# Patient Record
Sex: FEMALE | Race: WHITE | HISPANIC OR LATINO | Employment: FULL TIME | URBAN - METROPOLITAN AREA
[De-identification: names, ages, dates, MRNs, and addresses within clinical notes are randomized per-mention and may not be internally consistent; named-entity substitution may affect disease eponyms.]

---

## 2021-01-19 ENCOUNTER — GYNECOLOGY VISIT (OUTPATIENT)
Dept: OBGYN | Facility: CLINIC | Age: 26
End: 2021-01-19
Payer: COMMERCIAL

## 2021-01-19 VITALS
HEIGHT: 66 IN | BODY MASS INDEX: 25.81 KG/M2 | SYSTOLIC BLOOD PRESSURE: 114 MMHG | WEIGHT: 160.6 LBS | DIASTOLIC BLOOD PRESSURE: 62 MMHG

## 2021-01-19 DIAGNOSIS — N92.6 MISSED MENSES: ICD-10-CM

## 2021-01-19 DIAGNOSIS — Z32.01 POSITIVE PREGNANCY TEST: ICD-10-CM

## 2021-01-19 DIAGNOSIS — N93.8 DUB (DYSFUNCTIONAL UTERINE BLEEDING): ICD-10-CM

## 2021-01-19 LAB
INT CON NEG: NEGATIVE
INT CON POS: POSITIVE
POC URINE PREGNANCY TEST: POSITIVE

## 2021-01-19 PROCEDURE — 81025 URINE PREGNANCY TEST: CPT | Performed by: OBSTETRICS & GYNECOLOGY

## 2021-01-19 PROCEDURE — 99202 OFFICE O/P NEW SF 15 MIN: CPT | Performed by: OBSTETRICS & GYNECOLOGY

## 2021-01-19 SDOH — HEALTH STABILITY: MENTAL HEALTH: HOW OFTEN DO YOU HAVE A DRINK CONTAINING ALCOHOL?: NEVER

## 2021-01-19 NOTE — NON-PROVIDER
Patient here for GYN/DUB.  UPT= Positive  LMP= 10/28/2020  ARASELI= 08/04/2021  GA= 11w6d  Last pap: has not had one   Phone number: [phone]  Pharmacy verified  C/o Pt states having some nausea and some cramping.

## 2021-01-19 NOTE — PROGRESS NOTES
"GYN Visit    CC: missed menses    HPI: 25 y.o.  c/o missed period.LMP 10/28/2020, sure LMP, would be 11w6d  No VB, minimal nausea. No concerns today.          ROS:  gen: feels well, denies concerns  abd: denies pain, +nausea, negative vomiting  : denies vaginal bleeding, discharge, pain    OB History    Para Term  AB Living   2 1 1     1   SAB TAB Ectopic Molar Multiple Live Births             1      # Outcome Date GA Lbr Amrit/2nd Weight Sex Delivery Anes PTL Lv   2 Current            1 Term 16 40w0d  2.9 kg (6 lb 6.3 oz) F Vag-Spont   BISI      Birth Comments: Pt states no complications        GYNHx:  Menses q28d  Denies STIs  Never had a pap      PMHx: denies  PSHx: denies    Current Outpatient Medications on File Prior to Visit   Medication Sig Dispense Refill   • Prenatal MV-Min-Fe Fum-FA-DHA (PRENATAL 1 PO) Take  by mouth.       No current facility-administered medications on file prior to visit.      Allergies: Patient has no known allergies.    Family History   Problem Relation Age of Onset   • No Known Problems Mother    • No Known Problems Father    • No Known Problems Sister    • No Known Problems Brother    • Cancer Maternal Grandmother    • Cancer Maternal Grandfather    • No Known Problems Paternal Grandmother    • Alzheimer's Disease Paternal Grandfather    • No Known Problems Brother    • No Known Problems Sister    • No Known Problems Sister      Social History     Tobacco Use   • Smoking status: Never Smoker   • Smokeless tobacco: Never Used   Substance Use Topics   • Alcohol use: Never     Frequency: Never   • Drug use: Never       PE:   /62   Ht 1.676 m (5' 6\")   Wt 72.8 kg (160 lb 9.6 oz)   BMI 25.92 kg/m²   gen; AAO, NAD, affect appropriate  Ext: NT, edema  : normal external female genitalia  Skin: dry, intact, no lesions    Transvaginal US performed and per my read:    Indication: missed menses, +UPT    Findings:   cuadra intrauterine pregnancy with + " gestational sac  CRL 3.93 cm (10w6d)  Positive fetal cardiac activity, 160s BPM  Right ovary visualized and normal. Left Ovary visualized and normal. Cervical length grossly normal   No free fluid in the cul-de-sac.    Impression: viable single intrauterine pregnancy @ 10w6d. EDC by US of 2021          A/P: 25 y.o.  w/ missed menses, +UPT  Viable IUP confirmed today  Dating: ARASELI 21 by lmp c/w 11wk US today; US exactly 7 days off from LMP of which pt is sure so dating not changed (if >7d would change)      RTC for NOB visit    Domonique Shirley MD  [facility], Women's Health

## 2021-02-19 ENCOUNTER — OFFICE VISIT (OUTPATIENT)
Dept: OBGYN | Facility: CLINIC | Age: 26
End: 2021-02-19

## 2021-03-25 ENCOUNTER — INITIAL PRENATAL (OUTPATIENT)
Dept: OBGYN | Facility: CLINIC | Age: 26
End: 2021-03-25
Payer: COMMERCIAL

## 2021-03-25 ENCOUNTER — HOSPITAL ENCOUNTER (OUTPATIENT)
Facility: MEDICAL CENTER | Age: 26
End: 2021-03-25
Attending: NURSE PRACTITIONER
Payer: COMMERCIAL

## 2021-03-25 VITALS
BODY MASS INDEX: 25.58 KG/M2 | DIASTOLIC BLOOD PRESSURE: 73 MMHG | SYSTOLIC BLOOD PRESSURE: 121 MMHG | WEIGHT: 163 LBS | HEIGHT: 67 IN

## 2021-03-25 DIAGNOSIS — Z34.82 ENCOUNTER FOR SUPERVISION OF OTHER NORMAL PREGNANCY, SECOND TRIMESTER: Primary | ICD-10-CM

## 2021-03-25 DIAGNOSIS — Z34.82 ENCOUNTER FOR SUPERVISION OF OTHER NORMAL PREGNANCY, SECOND TRIMESTER: ICD-10-CM

## 2021-03-25 LAB
APPEARANCE UR: NORMAL
BILIRUB UR STRIP-MCNC: NORMAL MG/DL
COLOR UR AUTO: YELLOW
GLUCOSE UR STRIP.AUTO-MCNC: NEGATIVE MG/DL
KETONES UR STRIP.AUTO-MCNC: 15 MG/DL
LEUKOCYTE ESTERASE UR QL STRIP.AUTO: NORMAL
NITRITE UR QL STRIP.AUTO: NEGATIVE
PH UR STRIP.AUTO: 6.5 [PH] (ref 5–8)
PROT UR QL STRIP: NEGATIVE MG/DL
RBC UR QL AUTO: NEGATIVE
SP GR UR STRIP.AUTO: 1.02
UROBILINOGEN UR STRIP-MCNC: NORMAL MG/DL

## 2021-03-25 PROCEDURE — 59401 PR NEW OB VISIT: CPT | Performed by: NURSE PRACTITIONER

## 2021-03-25 PROCEDURE — 87491 CHLMYD TRACH DNA AMP PROBE: CPT

## 2021-03-25 PROCEDURE — 88175 CYTOPATH C/V AUTO FLUID REDO: CPT

## 2021-03-25 PROCEDURE — 81002 URINALYSIS NONAUTO W/O SCOPE: CPT | Performed by: NURSE PRACTITIONER

## 2021-03-25 PROCEDURE — 87591 N.GONORRHOEAE DNA AMP PROB: CPT

## 2021-03-25 ASSESSMENT — EDINBURGH POSTNATAL DEPRESSION SCALE (EPDS)
I HAVE BEEN ABLE TO LAUGH AND SEE THE FUNNY SIDE OF THINGS: AS MUCH AS I ALWAYS COULD
I HAVE FELT SCARED OR PANICKY FOR NO GOOD REASON: NO, NOT MUCH
I HAVE BEEN SO UNHAPPY THAT I HAVE HAD DIFFICULTY SLEEPING: NOT AT ALL
I HAVE FELT SAD OR MISERABLE: NOT VERY OFTEN
THINGS HAVE BEEN GETTING ON TOP OF ME: NO, I HAVE BEEN COPING AS WELL AS EVER
I HAVE BEEN ANXIOUS OR WORRIED FOR NO GOOD REASON: YES, SOMETIMES
TOTAL SCORE: 6
I HAVE LOOKED FORWARD WITH ENJOYMENT TO THINGS: AS MUCH AS I EVER DID
I HAVE BEEN SO UNHAPPY THAT I HAVE BEEN CRYING: ONLY OCCASIONALLY
I HAVE BLAMED MYSELF UNNECESSARILY WHEN THINGS WENT WRONG: NOT VERY OFTEN
THE THOUGHT OF HARMING MYSELF HAS OCCURRED TO ME: NEVER

## 2021-03-25 ASSESSMENT — ENCOUNTER SYMPTOMS
CARDIOVASCULAR NEGATIVE: 1
EYES NEGATIVE: 1
NEUROLOGICAL NEGATIVE: 1
GASTROINTESTINAL NEGATIVE: 1
CONSTITUTIONAL NEGATIVE: 1
RESPIRATORY NEGATIVE: 1
MUSCULOSKELETAL NEGATIVE: 1
PSYCHIATRIC NEGATIVE: 1

## 2021-03-25 NOTE — PROGRESS NOTES
S:  Thao Pate is a 26 y.o.  who presents for her new OB exam.  She is 21w1d with and ARASELI of Estimated Date of Delivery: 21 based off of LMP . She has no complaints.  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 Denies ER visits or previous care in this pregnancy but did have  here.     Desires AFP.  Declines CF.  Denies VB, LOF, or cramping.  Denies dysuria, vaginal DC. Reports normal fetal movement.     Pt is single and lives with FOB.  Pregnancy is planned. Same FOB as first child.      Discussed diet and exercise during pregnancy. Encouraged good nutrition, and daily exercise including walking or swimming. Discussed expected weight gain during pregnancy. Discussed adequate hydration during pregnancy.  Review of Systems   Constitutional: Negative.    HENT: Negative.    Eyes: Negative.    Respiratory: Negative.    Cardiovascular: Negative.    Gastrointestinal: Negative.    Genitourinary: Negative.    Musculoskeletal: Negative.    Skin: Negative.    Neurological: Negative.    Endo/Heme/Allergies: Negative.    Psychiatric/Behavioral: Negative.    All other systems reviewed and are negative.      History reviewed. No pertinent past medical history.  Family History   Problem Relation Age of Onset   • No Known Problems Mother    • No Known Problems Father    • No Known Problems Sister    • No Known Problems Brother    • Cancer Maternal Grandmother    • Cancer Maternal Grandfather    • No Known Problems Paternal Grandmother    • Alzheimer's Disease Paternal Grandfather    • No Known Problems Brother    • No Known Problems Sister    • No Known Problems Sister      Social History     Socioeconomic History   • Marital status: Single     Spouse name: Not on file   • Number of children: Not on file   • Years of education: Not on file   • Highest education level: Not on file   Occupational History   • Not on file   Tobacco Use   • Smoking status: Never Smoker   •  "Smokeless tobacco: Never Used   Substance and Sexual Activity   • Alcohol use: Never   • Drug use: Never   • Sexual activity: Yes     Partners: Male   Other Topics Concern   • Not on file   Social History Narrative   • Not on file     Social Determinants of Health     Financial Resource Strain:    • Difficulty of Paying Living Expenses:    Food Insecurity:    • Worried About Running Out of Food in the Last Year:    • Ran Out of Food in the Last Year:    Transportation Needs:    • Lack of Transportation (Medical):    • Lack of Transportation (Non-Medical):    Physical Activity:    • Days of Exercise per Week:    • Minutes of Exercise per Session:    Stress:    • Feeling of Stress :    Social Connections:    • Frequency of Communication with Friends and Family:    • Frequency of Social Gatherings with Friends and Family:    • Attends [employer] Services:    • Active Member of Clubs or Organizations:    • Attends Club or Organization Meetings:    • Marital Status:    Intimate Partner Violence:    • Fear of Current or Ex-Partner:    • Emotionally Abused:    • Physically Abused:    • Sexually Abused:      OB History    Para Term  AB Living   2 1 1     1   SAB TAB Ectopic Molar Multiple Live Births             1      # Outcome Date GA Lbr Amrit/2nd Weight Sex Delivery Anes PTL Lv   2 Current            1 Term 16 40w0d  2.9 kg (6 lb 6.3 oz) F Vag-Spont   BISI      Birth Comments: Pt states no complications        History of Varicella Virus: unsure  History of HSV I or II in self or partner: denies  History of Thyroid problems: denies    O:  /73   Ht 1.69 m (5' 6.54\")   Wt 73.9 kg (163 lb)    See Prenatal Physical.    Wet mount: deferred      A:   1.  IUP @ 21w1d per LMP and confirmed by US        2.  S=D        3.  See problem list below        4.  Supervision of normal other pregnancy     There are no problems to display for this patient.        P:  1.  GC/CT & pap done        2.  Prenatal labs " ordered - lab slip given        3.  Discussed PNV, diet, avoidances and adequate water intake        4.  NOB packet given        5.  Return to office in 4 wks        6.  Complete OB US in ASAP wks            Orders Placed This Encounter   • US-OB 2ND 3RD TRI COMPLETE   • THINPREP RFLX HPV ASCUS W/CTNG   • PREG CNTR PRENATAL PN   • HEP C VIRUS ANTIBODY   • URINE DRUG SCREEN W/CONF (AR)   • AFP TETRA   • FOLIC ACID PO

## 2021-03-25 NOTE — PROGRESS NOTES
NOB visit  LMP: 10/28/2021  Reports +FM  WT: 163 lb  BP: 121/73  Flu vaccine offered today, pt states she already received the flu vaccine at her job.  Pt states no other complaints or concerns today  Declines   Good # [phone]

## 2021-03-26 LAB
C TRACH DNA GENITAL QL NAA+PROBE: NEGATIVE
CYTOLOGY REG CYTOL: NORMAL
N GONORRHOEA DNA GENITAL QL NAA+PROBE: NEGATIVE
SPECIMEN SOURCE: NORMAL

## 2021-04-06 ENCOUNTER — HOSPITAL ENCOUNTER (OUTPATIENT)
Dept: RADIOLOGY | Facility: MEDICAL CENTER | Age: 26
End: 2021-04-06
Attending: NURSE PRACTITIONER
Payer: COMMERCIAL

## 2021-04-06 DIAGNOSIS — Z34.82 ENCOUNTER FOR SUPERVISION OF OTHER NORMAL PREGNANCY, SECOND TRIMESTER: ICD-10-CM

## 2021-04-06 DIAGNOSIS — O36.5920 POOR FETAL GROWTH AFFECTING MANAGEMENT OF MOTHER IN SECOND TRIMESTER, SINGLE OR UNSPECIFIED FETUS: ICD-10-CM

## 2021-04-06 PROCEDURE — 76805 OB US >/= 14 WKS SNGL FETUS: CPT

## 2021-04-07 ENCOUNTER — HOSPITAL ENCOUNTER (OUTPATIENT)
Dept: LAB | Facility: MEDICAL CENTER | Age: 26
End: 2021-04-07
Attending: NURSE PRACTITIONER
Payer: COMMERCIAL

## 2021-04-07 DIAGNOSIS — Z34.82 ENCOUNTER FOR SUPERVISION OF OTHER NORMAL PREGNANCY, SECOND TRIMESTER: ICD-10-CM

## 2021-04-07 LAB
ABO GROUP BLD: NORMAL
BACTERIA #/AREA URNS HPF: ABNORMAL /HPF
BLD GP AB SCN SERPL QL: NORMAL
EPI CELLS #/AREA URNS HPF: ABNORMAL /HPF
HYALINE CASTS #/AREA URNS LPF: ABNORMAL /LPF
RBC # URNS HPF: ABNORMAL /HPF
RH BLD: NORMAL
WBC #/AREA URNS HPF: ABNORMAL /HPF

## 2021-04-07 PROCEDURE — 87340 HEPATITIS B SURFACE AG IA: CPT

## 2021-04-07 PROCEDURE — 81511 FTL CGEN ABNOR FOUR ANAL: CPT

## 2021-04-07 PROCEDURE — 86850 RBC ANTIBODY SCREEN: CPT

## 2021-04-07 PROCEDURE — 85025 COMPLETE CBC W/AUTO DIFF WBC: CPT

## 2021-04-07 PROCEDURE — 87086 URINE CULTURE/COLONY COUNT: CPT

## 2021-04-07 PROCEDURE — 86803 HEPATITIS C AB TEST: CPT

## 2021-04-07 PROCEDURE — 86762 RUBELLA ANTIBODY: CPT

## 2021-04-07 PROCEDURE — 80307 DRUG TEST PRSMV CHEM ANLYZR: CPT

## 2021-04-07 PROCEDURE — 36415 COLL VENOUS BLD VENIPUNCTURE: CPT

## 2021-04-07 PROCEDURE — 81001 URINALYSIS AUTO W/SCOPE: CPT

## 2021-04-07 PROCEDURE — 86901 BLOOD TYPING SEROLOGIC RH(D): CPT

## 2021-04-07 PROCEDURE — 81015 MICROSCOPIC EXAM OF URINE: CPT

## 2021-04-07 PROCEDURE — 86780 TREPONEMA PALLIDUM: CPT

## 2021-04-07 PROCEDURE — 87389 HIV-1 AG W/HIV-1&-2 AB AG IA: CPT

## 2021-04-07 PROCEDURE — 86900 BLOOD TYPING SEROLOGIC ABO: CPT

## 2021-04-08 LAB
APPEARANCE UR: ABNORMAL
BASOPHILS # BLD AUTO: 0.4 % (ref 0–1.8)
BASOPHILS # BLD: 0.03 K/UL (ref 0–0.12)
BILIRUB UR QL STRIP.AUTO: NEGATIVE
COLOR UR: YELLOW
EOSINOPHIL # BLD AUTO: 0.08 K/UL (ref 0–0.51)
EOSINOPHIL NFR BLD: 1 % (ref 0–6.9)
ERYTHROCYTE [DISTWIDTH] IN BLOOD BY AUTOMATED COUNT: 42.5 FL (ref 35.9–50)
GLUCOSE UR STRIP.AUTO-MCNC: NEGATIVE MG/DL
HBV SURFACE AG SER QL: ABNORMAL
HCT VFR BLD AUTO: 38.4 % (ref 37–47)
HGB BLD-MCNC: 12.9 G/DL (ref 12–16)
HIV 1+2 AB+HIV1 P24 AG SERPL QL IA: NORMAL
IMM GRANULOCYTES # BLD AUTO: 0.04 K/UL (ref 0–0.11)
IMM GRANULOCYTES NFR BLD AUTO: 0.5 % (ref 0–0.9)
KETONES UR STRIP.AUTO-MCNC: ABNORMAL MG/DL
LEUKOCYTE ESTERASE UR QL STRIP.AUTO: ABNORMAL
LYMPHOCYTES # BLD AUTO: 1.79 K/UL (ref 1–4.8)
LYMPHOCYTES NFR BLD: 21.9 % (ref 22–41)
MCH RBC QN AUTO: 30.3 PG (ref 27–33)
MCHC RBC AUTO-ENTMCNC: 33.6 G/DL (ref 33.6–35)
MCV RBC AUTO: 90.1 FL (ref 81.4–97.8)
MICRO URNS: ABNORMAL
MONOCYTES # BLD AUTO: 0.53 K/UL (ref 0–0.85)
MONOCYTES NFR BLD AUTO: 6.5 % (ref 0–13.4)
NEUTROPHILS # BLD AUTO: 5.72 K/UL (ref 2–7.15)
NEUTROPHILS NFR BLD: 69.7 % (ref 44–72)
NITRITE UR QL STRIP.AUTO: NEGATIVE
NRBC # BLD AUTO: 0 K/UL
NRBC BLD-RTO: 0 /100 WBC
PH UR STRIP.AUTO: 6 [PH] (ref 5–8)
PLATELET # BLD AUTO: 261 K/UL (ref 164–446)
PMV BLD AUTO: 11.6 FL (ref 9–12.9)
PROT UR QL STRIP: NEGATIVE MG/DL
RBC # BLD AUTO: 4.26 M/UL (ref 4.2–5.4)
RBC UR QL AUTO: NEGATIVE
RUBV AB SER QL: 172 IU/ML
SP GR UR STRIP.AUTO: 1.02
TREPONEMA PALLIDUM IGG+IGM AB [PRESENCE] IN SERUM OR PLASMA BY IMMUNOASSAY: ABNORMAL
UROBILINOGEN UR STRIP.AUTO-MCNC: 0.2 MG/DL
WBC # BLD AUTO: 8.2 K/UL (ref 4.8–10.8)

## 2021-04-09 LAB
AMPHET CTO UR CFM-MCNC: NEGATIVE NG/ML
BARBITURATES CTO UR CFM-MCNC: NEGATIVE NG/ML
BENZODIAZ CTO UR CFM-MCNC: NEGATIVE NG/ML
CANNABINOIDS CTO UR CFM-MCNC: NEGATIVE NG/ML
COCAINE CTO UR CFM-MCNC: NEGATIVE NG/ML
DRUG COMMENT 753798: NORMAL
HCV AB SER QL: NORMAL
METHADONE CTO UR CFM-MCNC: NEGATIVE NG/ML
OPIATES CTO UR CFM-MCNC: NEGATIVE NG/ML
PCP CTO UR CFM-MCNC: NEGATIVE NG/ML
PROPOXYPH CTO UR CFM-MCNC: NEGATIVE NG/ML

## 2021-04-10 LAB
# FETUSES US: NORMAL
AFP MOM SERPL: 0.9
AFP SERPL-MCNC: 81 NG/ML
AGE - REPORTED: 25.4 YR
BACTERIA UR CULT: NORMAL
CURRENT SMOKER: NO
FAMILY MEMBER DISEASES HX: NO
GA METHOD: NORMAL
GA: NORMAL WK
HCG MOM SERPL: 0.87
HCG SERPL-ACNC: NORMAL IU/L
HX OF HEREDITARY DISORDERS: NO
IDDM PATIENT QL: NO
INHIBIN A MOM SERPL: 0.88
INHIBIN A SERPL-MCNC: 191 PG/ML
INTEGRATED SCN PATIENT-IMP: NORMAL
PATHOLOGY STUDY: NORMAL
SIGNIFICANT IND 70042: NORMAL
SITE SITE: NORMAL
SOURCE SOURCE: NORMAL
SPECIMEN DRAWN SERPL: NORMAL
U ESTRIOL MOM SERPL: 0.61
U ESTRIOL SERPL-MCNC: 2.14 NG/ML

## 2021-04-14 DIAGNOSIS — O28.3 ABNORMAL FETAL ULTRASOUND: ICD-10-CM

## 2021-04-15 ENCOUNTER — TELEPHONE (OUTPATIENT)
Dept: OBGYN | Facility: CLINIC | Age: 26
End: 2021-04-15

## 2021-04-15 NOTE — TELEPHONE ENCOUNTER
----- Message from JUDAH Pham sent at 4/14/2021  2:51 PM PDT -----  Please refer to [facility] for follow up on growth.  Let her know baby is measuring small for how far along she is.  Referral placed to [facility]      4/15/2021 1256 Left message for pt to call back regarding US results.   4/19/2021 1229 Left message for pt to call back regarding US results.    4/21/2021 0940 Called pt and notified as above. Pt informed referral was sent on 4/19/2021 advised pt to call [facility], phone# provided and to keep scheduled appts with US. Pt agreed and verbalized understanding.

## 2021-04-23 ENCOUNTER — ROUTINE PRENATAL (OUTPATIENT)
Dept: OBGYN | Facility: CLINIC | Age: 26
End: 2021-04-23
Payer: COMMERCIAL

## 2021-04-23 VITALS — WEIGHT: 165 LBS | BODY MASS INDEX: 26.21 KG/M2 | DIASTOLIC BLOOD PRESSURE: 75 MMHG | SYSTOLIC BLOOD PRESSURE: 107 MMHG

## 2021-04-23 DIAGNOSIS — Z34.82 ENCOUNTER FOR SUPERVISION OF OTHER NORMAL PREGNANCY, SECOND TRIMESTER: ICD-10-CM

## 2021-04-23 PROCEDURE — 90040 PR PRENATAL FOLLOW UP: CPT | Performed by: OBSTETRICS & GYNECOLOGY

## 2021-04-23 NOTE — PROGRESS NOTES
OB Followup;    25w2d    There are no problems to display for this patient.      Vitals:    21 1404   BP: 107/75   Weight: 74.8 kg (165 lb)       Patient presents for followup of OB care. Currently doing well . Good fetal movement no leakage of fluid no contractions or vaginal bleeding        Size equals dates, normal fetal heart rate      Labs; ultrasound performed at [facility] shows size less than dates 4th percentile the nurse midwife has scheduled her an appointment for follow-up with [facility] to check  Patient given lab slip for CBC GTT and RPR    Labor precautions given  Discussed proper weight gain during pregnancy.    Signs and symptoms of labor/ labor discussed   Discussed our group's policy on prenatal checkups and delivery  Discussed Covid precautions  Discussed Covid vaccination recommendations from CDC/ACOG  Discussed proper exercise during pregnancy  Discussed proper oral fluid hydration  Currently taking prenatal vitamins as instructed  Reviewed fetal kick counts and appropriate fetal movement during pregnancy  Reviewed postpartum birth control methods  All questions answered in detail    Followup in  2 weeks

## 2021-04-23 NOTE — PROGRESS NOTES
Pt here today for OB follow up  Pt states no VB or LOF   Reports +FM  Good # [phone]   Pharmacy Confirmed.  Chaperone offered and provided.   Labs ordered today.

## 2021-05-05 PROBLEM — O36.5920 POOR FETAL GROWTH AFFECTING MANAGEMENT OF MOTHER IN SECOND TRIMESTER: Status: ACTIVE | Noted: 2021-05-05

## 2021-05-12 ENCOUNTER — HOSPITAL ENCOUNTER (OUTPATIENT)
Dept: LAB | Facility: MEDICAL CENTER | Age: 26
End: 2021-05-12
Attending: OBSTETRICS & GYNECOLOGY
Payer: COMMERCIAL

## 2021-05-12 DIAGNOSIS — Z34.82 ENCOUNTER FOR SUPERVISION OF OTHER NORMAL PREGNANCY, SECOND TRIMESTER: ICD-10-CM

## 2021-05-12 LAB
ERYTHROCYTE [DISTWIDTH] IN BLOOD BY AUTOMATED COUNT: 45.4 FL (ref 35.9–50)
GLUCOSE 1H P 50 G GLC PO SERPL-MCNC: 106 MG/DL (ref 70–139)
HCT VFR BLD AUTO: 39.9 % (ref 37–47)
HGB BLD-MCNC: 12.9 G/DL (ref 12–16)
MCH RBC QN AUTO: 30.6 PG (ref 27–33)
MCHC RBC AUTO-ENTMCNC: 32.3 G/DL (ref 33.6–35)
MCV RBC AUTO: 94.5 FL (ref 81.4–97.8)
PLATELET # BLD AUTO: 241 K/UL (ref 164–446)
PMV BLD AUTO: 11.6 FL (ref 9–12.9)
RBC # BLD AUTO: 4.22 M/UL (ref 4.2–5.4)
TREPONEMA PALLIDUM IGG+IGM AB [PRESENCE] IN SERUM OR PLASMA BY IMMUNOASSAY: NORMAL
WBC # BLD AUTO: 8.1 K/UL (ref 4.8–10.8)

## 2021-05-12 PROCEDURE — 86780 TREPONEMA PALLIDUM: CPT

## 2021-05-12 PROCEDURE — 36415 COLL VENOUS BLD VENIPUNCTURE: CPT

## 2021-05-12 PROCEDURE — 82950 GLUCOSE TEST: CPT

## 2021-05-12 PROCEDURE — 85027 COMPLETE CBC AUTOMATED: CPT

## 2021-05-14 ENCOUNTER — ROUTINE PRENATAL (OUTPATIENT)
Dept: OBGYN | Facility: CLINIC | Age: 26
End: 2021-05-14
Payer: COMMERCIAL

## 2021-05-14 VITALS — SYSTOLIC BLOOD PRESSURE: 101 MMHG | BODY MASS INDEX: 27.79 KG/M2 | WEIGHT: 175 LBS | DIASTOLIC BLOOD PRESSURE: 72 MMHG

## 2021-05-14 DIAGNOSIS — O36.5930 POOR FETAL GROWTH AFFECTING MANAGEMENT OF MOTHER IN THIRD TRIMESTER, SINGLE OR UNSPECIFIED FETUS: ICD-10-CM

## 2021-05-14 DIAGNOSIS — Z3A.28 28 WEEKS GESTATION OF PREGNANCY: ICD-10-CM

## 2021-05-14 PROCEDURE — 90040 PR PRENATAL FOLLOW UP: CPT | Performed by: OBSTETRICS & GYNECOLOGY

## 2021-05-14 PROCEDURE — 90715 TDAP VACCINE 7 YRS/> IM: CPT | Performed by: OBSTETRICS & GYNECOLOGY

## 2021-05-14 PROCEDURE — 90471 IMMUNIZATION ADMIN: CPT | Performed by: OBSTETRICS & GYNECOLOGY

## 2021-05-14 NOTE — PROGRESS NOTES
Pt here today for OB follow up  Pt states no VB or LOF   Reports +FM  Good # [phone]   Pharmacy Confirmed.  Tdap Today   NOLA sheet given

## 2021-05-14 NOTE — LETTER
Cuente los Movimientos de olsen Bebé  Otro paso importante para la jess de olsen bebé    Thao Torres Pate     [facility]            Dept: [phone]    ¿Cuántas semanas tiene de embarazo? 28w2d    Fecha cuando tiene que comenzar a contar el movimiento:                   [place] debe usar christy diagrama    [place]ala[place] manera en que olsen doctor puede controlar a jess de olsen bebé es sabiendo cuantas veces se mueve olsen bebé en el útero, o por medio de las “pataditas”.  Usted podrá ayudarle a olsen médico al usar cada día el siguiente diagrama.    Cada día, usted debe prestar atención a cuantas horas le lleva a olsen bebé moverse 10 veces.  Comience a contar en la mañana, lo antes posible después de haberse levantado.    · Primeramente, escriba la hora en que se mueve olsen bebé, hasta llegar a 10 veces.  · Colóquele un check o palomita a cada cuadrito cada vez que olsen bebé se mueva hasta que complete 10 veces.  · Escriba la hora cuando termine de contar 10 veces en la última columna.  · Sume el total del tiempo que le llevó contar los 10 movimientos.  · Finalmente, complete el cuadrito de cuantas horas le llevó hacerlo.    Después de misael contado los 10 movimientos, ya no tendrá que contar los demás movimientos por el bruno del día.  A la mañana siguiente, comience a contar de nuevo cuantas veces se mueve el bebé desde el momento en que se levante.    ¿Qué tendría que considerarse un “movimiento”?  Es difícil de decirlo porque es distinto de malaika madre a otra, y de un embarazo a otro.  Lo importante es que cuente el movimiento de la misma manera tito el transcurso de olsen embarazo.  Si tiene preguntas adicionales, pregúntele a olsen doctor.    ¡Cuente cuidadosamente cada día!     MUESTRA:  Semana 28    ¿Cuántas horas le ha llevado sentir 10 movimientos?        Hora de Inicio     1     2     3     4     5     6     7     8     9     10   Hora de Finlizar   Simba. 8:20 ·  ·  ·  ·  ·  ·  ·  ·  ·  ·  11:40   Mar.                Mié.               Jue.               Vie.               Sáb.               Dom.                 IMPORTANTE:  Usted debe contactar a olsen doctor si le lleva más de 2 horas sentir 10 movimientos de olsen bebé.    Cada mañana, escriba la hora de inicio y comience a contar los movimientos de olsen bebé.  Hágalo colocándole un check o palomita a cada cuadrito cada vez que sienta un movimiento de olsen bebé.  Cuando haya sentido 10 “pataditas”, escriba la hora en que terminó de contar en la última columna.  Luego, complete en la cajita (arriba de la alberto de check o palomita) el número total de horas que le llevó hacerlo.  Asegúrese de leer completamente las instrucciones en la página anterior.

## 2021-05-14 NOTE — PROGRESS NOTES
Chief complaint: Return OB visit     S: Pt presents for routine OB follow up. Good fetal movement.  No contractions, vaginal bleeding, or leakage of fluid.    Questions answered.    O: /72   Wt 79.4 kg (175 lb)   LMP 10/28/2020   BMI 27.79 kg/m²   Patients' weight gain, fluid intake and exercise level discussed.  Vitals, fundal height , fetal position, and FHR reviewed on flowsheet    Lab:  Recent Results (from the past 336 hour(s))   T.PALLIDUM AB EIA    Collection Time: 21  7:09 AM   Result Value Ref Range    Syphilis, Treponemal Qual Non-Reactive Non-Reactive   CBC WITHOUT DIFFERENTIAL    Collection Time: 21  7:09 AM   Result Value Ref Range    WBC 8.1 4.8 - 10.8 K/uL    RBC 4.22 4.20 - 5.40 M/uL    Hemoglobin 12.9 12.0 - 16.0 g/dL    Hematocrit 39.9 37.0 - 47.0 %    MCV 94.5 81.4 - 97.8 fL    MCH 30.6 27.0 - 33.0 pg    MCHC 32.3 (L) 33.6 - 35.0 g/dL    RDW 45.4 35.9 - 50.0 fL    Platelet Count 241 164 - 446 K/uL    MPV 11.6 9.0 - 12.9 fL   GLUCOSE 1HR GESTATIONAL    Collection Time: 21  7:09 AM   Result Value Ref Range    Glucose, Post Dose 106 70 - 139 mg/dL       A/P:  26 y.o.  at 28w2d presents for routine obstetric follow-up.  Size equals dates and/or scan    1.  Continue prenatal vitamins.  2.  Fetal kick counts.  3.  Exercise at least 30 minutes daily.  4.  Drink at least 2L of water daily  5.  Labor precautions educated.  6.  Follow-up in 1 weeks.  7.  GBS at 35 weeks    Patient is asking if  monitoring can be performed in our office as it is close to her house. This week she only has to present to [facility] risk pregnancy center once a week for an ultrasound. She reports that she has discussed this with a high risk pregnancy center and that they are okay with this change of venue    Patient was asked to schedule  testing next week    Patient also asked what is the cost associated with a NST in our office. I do not have an answer for her at this time, but  have sent an email to billing dept.

## 2021-05-14 NOTE — LETTER
Cuente los Movimientos de olsen Bebé  Otro paso importante para la jess de olsen bebé    Thaojulianna Dolangua     [facility]            Dept: [phone]    ¿Cuántas semanas tiene de embarazo? 28w2d    Fecha cuando tiene que comenzar a contar el movimiento                  [place] debe usar christy diagrama    Malaika manera en que olsen doctor puede controlar a jess de olsen bebé es sabiendo cuantas veces se mueve olsen bebé en el útero, o por medio de las “pataditas”.  Usted podrá ayudarle a olsen médico al usar cada día el siguiente diagrama.    Cada día, usted debe prestar atención a cuantas horas le lleva a olsen bebé moverse 10 veces.  Comience a contar en la mañana, lo antes posible después de haberse levantado.    · Primeramente, escriba la hora en que se mueve olsen bebé, hasta llegar a 10 veces.  · Colóquele un check o palomita a cada cuadrito cada vez que olsen bebé se mueva hasta que complete 10 veces.  · Escriba la hora cuando termine de contar 10 veces en la última columna.  · Sume el total del tiempo que le llevó contar los 10 movimientos.  · Finalmente, complete el cuadrito de cuantas horas le llevó hacerlo.    Después de misael contado los 10 movimientos, ya no tendrá que contar los demás movimientos por el bruno del día.  A la mañana siguiente, comience a contar de nuevo cuantas veces se mueve el bebé desde el momento en que se levante.    ¿Qué tendría que considerarse un “movimiento”?  Es difícil de decirlo porque es distinto de malaika madre a otra, y de un embarazo a otro.  Lo importante es que cuente el movimiento de la misma manera tito el transcurso de olsen embarazo.  Si tiene preguntas adicionales, pregúntele a olsen doctor.    ¡Cuente cuidadosamente cada día!     MUESTRA:  Semana 28    ¿Cuántas horas le ha llevado sentir 10 movimientos?        Hora de Inicio     1     2     3     4     5     6     7     8     9     10   Hora de Finlizar   Simba. 8:20 ·  ·  ·  ·  ·  ·  ·  ·  ·  ·  11:40   Mar.                Mié.               Jue.               Vie.               Sáb.               Dom.                 IMPORTANTE:  Usted debe contactar a olsen doctor si le lleva más de 2 horas sentir 10 movimientos de olsen bebé.    Cada mañana, escriba la hora de inicio y comience a contar los movimientos de olsen bebé.  Hágalo colocándole un check o palomita a cada cuadrito cada vez que sienta un movimiento de olsen bebé.  Cuando haya sentido 10 “pataditas”, escriba la hora en que terminó de contar en la última columna.  Luego, complete en la cajita (arriba de la alberto de check o palomita) el número total de horas que le llevó hacerlo.  Asegúrese de leer completamente las instrucciones en la página anterior.

## 2021-05-17 ENCOUNTER — ROUTINE PRENATAL (OUTPATIENT)
Dept: OBGYN | Facility: CLINIC | Age: 26
End: 2021-05-17
Payer: COMMERCIAL

## 2021-05-17 DIAGNOSIS — O36.5930 POOR FETAL GROWTH AFFECTING MANAGEMENT OF MOTHER IN THIRD TRIMESTER, SINGLE OR UNSPECIFIED FETUS: ICD-10-CM

## 2021-05-17 LAB
NST ACOUSTIC STIMULATION: NO
NST ACTION NECESSARY: NORMAL
NST ASSESSMENT: NORMAL
NST BASELINE: NORMAL
NST INDICATIONS: NORMAL
NST OTHER DATA: NORMAL
NST READ BY: NORMAL
NST RETURN: NORMAL
NST UTERINE ACTIVITY: NO

## 2021-05-17 PROCEDURE — 99999 PR NO CHARGE: CPT | Performed by: OBSTETRICS & GYNECOLOGY

## 2021-05-21 ENCOUNTER — ROUTINE PRENATAL (OUTPATIENT)
Dept: OBGYN | Facility: CLINIC | Age: 26
End: 2021-05-21
Payer: COMMERCIAL

## 2021-05-21 DIAGNOSIS — O36.5930 POOR FETAL GROWTH AFFECTING MANAGEMENT OF MOTHER IN THIRD TRIMESTER, SINGLE OR UNSPECIFIED FETUS: ICD-10-CM

## 2021-05-21 LAB
NST ACOUSTIC STIMULATION: NO
NST ACTION NECESSARY: NO
NST ASSESSMENT: NORMAL
NST BASELINE: 135
NST INDICATIONS: NORMAL
NST OTHER DATA: NORMAL
NST READ BY: NORMAL
NST RETURN: NORMAL
NST UTERINE ACTIVITY: NO

## 2021-05-21 PROCEDURE — 99999 PR NO CHARGE: CPT | Performed by: OBSTETRICS & GYNECOLOGY

## 2021-05-24 ENCOUNTER — ROUTINE PRENATAL (OUTPATIENT)
Dept: OBGYN | Facility: CLINIC | Age: 26
End: 2021-05-24
Payer: COMMERCIAL

## 2021-05-24 DIAGNOSIS — O36.5930 POOR FETAL GROWTH AFFECTING MANAGEMENT OF MOTHER IN THIRD TRIMESTER, SINGLE OR UNSPECIFIED FETUS: ICD-10-CM

## 2021-05-24 PROCEDURE — 99999 PR NO CHARGE: CPT | Performed by: OBSTETRICS & GYNECOLOGY

## 2021-05-27 ENCOUNTER — ROUTINE PRENATAL (OUTPATIENT)
Dept: OBGYN | Facility: CLINIC | Age: 26
End: 2021-05-27
Payer: COMMERCIAL

## 2021-05-27 DIAGNOSIS — O36.5930 POOR FETAL GROWTH AFFECTING MANAGEMENT OF MOTHER IN THIRD TRIMESTER, SINGLE OR UNSPECIFIED FETUS: Primary | ICD-10-CM

## 2021-05-27 LAB
NST ACOUSTIC STIMULATION: NORMAL
NST ACTION NECESSARY: NORMAL
NST ASSESSMENT: NORMAL
NST BASELINE: NORMAL
NST INDICATIONS: NORMAL
NST OTHER DATA: NORMAL
NST READ BY: NORMAL
NST RETURN: NORMAL
NST UTERINE ACTIVITY: NORMAL

## 2021-05-27 PROCEDURE — 59025 FETAL NON-STRESS TEST: CPT | Performed by: OBSTETRICS & GYNECOLOGY

## 2021-05-27 NOTE — PROCEDURES
Baseline 130 with moderate variability, accelerations present, no decelerations noted.  Reactive NST.

## 2021-06-02 ENCOUNTER — ROUTINE PRENATAL (OUTPATIENT)
Dept: OBGYN | Facility: CLINIC | Age: 26
End: 2021-06-02
Payer: COMMERCIAL

## 2021-06-02 DIAGNOSIS — O36.5930 POOR FETAL GROWTH AFFECTING MANAGEMENT OF MOTHER IN THIRD TRIMESTER, SINGLE OR UNSPECIFIED FETUS: Primary | ICD-10-CM

## 2021-06-02 PROCEDURE — 59025 FETAL NON-STRESS TEST: CPT | Performed by: OBSTETRICS & GYNECOLOGY

## 2021-06-02 NOTE — PROCEDURES
NST: Baseline 120 with moderate variability, accelerations 10 x 10 criteria present, no decelerations noted.  No uterine activity noted.  Indication IUGR.  Reactive NST.

## 2021-06-04 ENCOUNTER — ROUTINE PRENATAL (OUTPATIENT)
Dept: OBGYN | Facility: CLINIC | Age: 26
End: 2021-06-04
Payer: COMMERCIAL

## 2021-06-04 DIAGNOSIS — O36.5930 POOR FETAL GROWTH AFFECTING MANAGEMENT OF MOTHER IN THIRD TRIMESTER, SINGLE OR UNSPECIFIED FETUS: ICD-10-CM

## 2021-06-04 PROCEDURE — 59025 FETAL NON-STRESS TEST: CPT | Performed by: OBSTETRICS & GYNECOLOGY

## 2021-06-07 ENCOUNTER — ROUTINE PRENATAL (OUTPATIENT)
Dept: OBGYN | Facility: CLINIC | Age: 26
End: 2021-06-07
Payer: COMMERCIAL

## 2021-06-07 DIAGNOSIS — O36.5930 POOR FETAL GROWTH AFFECTING MANAGEMENT OF MOTHER IN THIRD TRIMESTER, SINGLE OR UNSPECIFIED FETUS: Primary | ICD-10-CM

## 2021-06-07 LAB
NST ACOUSTIC STIMULATION: NO
NST ACTION NECESSARY: NORMAL
NST ASSESSMENT: NORMAL
NST BASELINE: 130
NST INDICATIONS: NORMAL
NST OTHER DATA: NORMAL
NST READ BY: NORMAL
NST RETURN: NORMAL
NST UTERINE ACTIVITY: NO

## 2021-06-07 PROCEDURE — 59025 FETAL NON-STRESS TEST: CPT | Performed by: NURSE PRACTITIONER

## 2021-06-10 ENCOUNTER — HOSPITAL ENCOUNTER (OUTPATIENT)
Facility: MEDICAL CENTER | Age: 26
End: 2021-06-10
Attending: OBSTETRICS & GYNECOLOGY | Admitting: OBSTETRICS & GYNECOLOGY
Payer: COMMERCIAL

## 2021-06-10 ENCOUNTER — APPOINTMENT (OUTPATIENT)
Dept: OBGYN | Facility: CLINIC | Age: 26
End: 2021-06-10
Payer: COMMERCIAL

## 2021-06-10 ENCOUNTER — ROUTINE PRENATAL (OUTPATIENT)
Dept: OBGYN | Facility: CLINIC | Age: 26
End: 2021-06-10
Payer: COMMERCIAL

## 2021-06-10 ENCOUNTER — APPOINTMENT (OUTPATIENT)
Dept: RADIOLOGY | Facility: MEDICAL CENTER | Age: 26
End: 2021-06-10
Attending: OBSTETRICS & GYNECOLOGY
Payer: COMMERCIAL

## 2021-06-10 VITALS — WEIGHT: 180 LBS | DIASTOLIC BLOOD PRESSURE: 69 MMHG | SYSTOLIC BLOOD PRESSURE: 129 MMHG | BODY MASS INDEX: 28.59 KG/M2

## 2021-06-10 VITALS
OXYGEN SATURATION: 98 % | RESPIRATION RATE: 18 BRPM | HEART RATE: 93 BPM | SYSTOLIC BLOOD PRESSURE: 120 MMHG | TEMPERATURE: 97.2 F | DIASTOLIC BLOOD PRESSURE: 79 MMHG

## 2021-06-10 DIAGNOSIS — O36.5930 POOR FETAL GROWTH AFFECTING MANAGEMENT OF MOTHER IN THIRD TRIMESTER, SINGLE OR UNSPECIFIED FETUS: Primary | ICD-10-CM

## 2021-06-10 PROCEDURE — 90040 PR PRENATAL FOLLOW UP: CPT | Performed by: OBSTETRICS & GYNECOLOGY

## 2021-06-10 PROCEDURE — 59025 FETAL NON-STRESS TEST: CPT

## 2021-06-10 PROCEDURE — 76819 FETAL BIOPHYS PROFIL W/O NST: CPT

## 2021-06-10 NOTE — PROGRESS NOTES
EDC  32 1     1230-pt presents from the office for BPP due to variables in the office, pt has IUGR, no c/o leaking, bleeding, pain or uc's, states baby is moving normally, placed on external monitors, vs taken, US ordered,  used to instruct pt to POC, Tacho from [facility] ([id number]), all questions answered, verbalized understanding  1335-US here, BPP  JOSELYN 16 cm  1350-TC Dr Oates, report given, discharge order received  1355-pt discharged home with PTL precautions, used [facility] ( Colleen [phone]), verbalized understanding, left ambulatory on her own

## 2021-06-10 NOTE — PROGRESS NOTES
MD L&D progress note     S: 26-year-old  2 para 1-0-0-1 at 32 weeks 1 day gestational age.  Patient sent from clinic secondary to nonreactive  screening.  Pregnancy complicated by fetal growth restriction at 3rd percentile.    O: VSS afebrile  FHR -130, pos accels, neg decels, mod variability, cat 1 FHR  [place] -none  SVE -deferred     A: IUP at 32 weeks and 1 day      P: No decelerations noted on today's examination.  Biophysical profile 8 out of 8.  Has follow-up with high risk pregnancy center tomorrow.    Reassuring  testing.    Discussed with patient    Precautions also discussed with patient    All questions answered

## 2021-06-10 NOTE — PROGRESS NOTES
OB Followup;    32w1d    Patient Active Problem List    Diagnosis Date Noted   • 28 weeks gestation of pregnancy 2021   • Poor fetal growth affecting management of mother in third trimester 2021       Vitals:    06/10/21 1005   BP: 129/69   Weight: 81.6 kg (180 lb)       Patient presents for followup of OB care. Currently doing well . Good fetal movement no leakage of fluid no contractions or vaginal bleeding        Size equals dates, normal fetal heart rate      Labs; ultrasound at [facility] shows estimated fetal weight at 3rd percentile-FGR  NSTs twice weekly and follow-up with [facility] on 2021-umbilical cord SD ratio and repeat growth studies    NST-reactive but to variable decelerations-patient sent to labor and delivery for biophysical profile and JOSELYN-discussed with Kya Gaston RN-charge nurse    Labor precautions given  Discussed proper weight gain during pregnancy.    Signs and symptoms of labor/ labor discussed   Discussed our group's policy on prenatal checkups and delivery  Discussed Covid precautions  Discussed Covid vaccination recommendations from CDC/ACOG  Discussed proper exercise during pregnancy  Discussed proper oral fluid hydration  Currently taking prenatal vitamins as instructed  Reviewed fetal kick counts and appropriate fetal movement during pregnancy  Reviewed postpartum birth control methods  All questions answered in detail    Followup in  1 weeks

## 2021-06-10 NOTE — PROGRESS NOTES
Pt here today for OB follow up + NST  32w1d  Pt states no VB or LOF  Reports +FM  Good # [phone]   Pharmacy Confirmed.  Chaperone offered and provided.

## 2021-06-14 ENCOUNTER — ROUTINE PRENATAL (OUTPATIENT)
Dept: OBGYN | Facility: CLINIC | Age: 26
End: 2021-06-14
Payer: COMMERCIAL

## 2021-06-14 DIAGNOSIS — O36.5930 POOR FETAL GROWTH AFFECTING MANAGEMENT OF MOTHER IN THIRD TRIMESTER, SINGLE OR UNSPECIFIED FETUS: Primary | ICD-10-CM

## 2021-06-14 PROCEDURE — 59025 FETAL NON-STRESS TEST: CPT | Performed by: OBSTETRICS & GYNECOLOGY

## 2021-06-18 ENCOUNTER — ROUTINE PRENATAL (OUTPATIENT)
Dept: OBGYN | Facility: CLINIC | Age: 26
End: 2021-06-18
Payer: COMMERCIAL

## 2021-06-18 DIAGNOSIS — O36.5930 POOR FETAL GROWTH AFFECTING MANAGEMENT OF MOTHER IN THIRD TRIMESTER, SINGLE OR UNSPECIFIED FETUS: ICD-10-CM

## 2021-06-18 LAB
NST ACOUSTIC STIMULATION: NO
NST ACTION NECESSARY: NO
NST ASSESSMENT: REACTIVE
NST BASELINE: 130
NST INDICATIONS: NORMAL
NST OTHER DATA: NORMAL
NST READ BY: NORMAL
NST RETURN: NORMAL
NST UTERINE ACTIVITY: NO

## 2021-06-18 PROCEDURE — 59025 FETAL NON-STRESS TEST: CPT | Performed by: OBSTETRICS & GYNECOLOGY

## 2021-06-21 ENCOUNTER — ROUTINE PRENATAL (OUTPATIENT)
Dept: OBGYN | Facility: CLINIC | Age: 26
End: 2021-06-21
Payer: COMMERCIAL

## 2021-06-21 ENCOUNTER — TELEPHONE (OUTPATIENT)
Dept: OBGYN | Facility: CLINIC | Age: 26
End: 2021-06-21

## 2021-06-21 DIAGNOSIS — O36.5930 POOR FETAL GROWTH AFFECTING MANAGEMENT OF MOTHER IN THIRD TRIMESTER, SINGLE OR UNSPECIFIED FETUS: ICD-10-CM

## 2021-06-21 PROCEDURE — 59025 FETAL NON-STRESS TEST: CPT | Performed by: ADVANCED PRACTICE MIDWIFE

## 2021-06-21 NOTE — TELEPHONE ENCOUNTER
Called patient to reassure FMLA starting date of disability. Pt stated 8/4/21. Informed pt I will fax over FMLA to provided number pt understood

## 2021-06-25 ENCOUNTER — ROUTINE PRENATAL (OUTPATIENT)
Dept: OBGYN | Facility: CLINIC | Age: 26
End: 2021-06-25
Payer: COMMERCIAL

## 2021-06-25 DIAGNOSIS — O36.5930 POOR FETAL GROWTH AFFECTING MANAGEMENT OF MOTHER IN THIRD TRIMESTER, SINGLE OR UNSPECIFIED FETUS: ICD-10-CM

## 2021-06-25 LAB
NST ACOUSTIC STIMULATION: NO
NST ACTION NECESSARY: NORMAL
NST ASSESSMENT: NORMAL
NST BASELINE: 130
NST INDICATIONS: NORMAL
NST OTHER DATA: NORMAL
NST READ BY: NORMAL
NST RETURN: NORMAL
NST UTERINE ACTIVITY: NORMAL

## 2021-06-25 PROCEDURE — 90040 PR PRENATAL FOLLOW UP: CPT | Performed by: NURSE PRACTITIONER

## 2021-06-25 NOTE — PROGRESS NOTES
Pt had appt with [facility] on 6/22. Last report 6/11 and normal dopplers with growth at 3% and AC 2%.  No report yet but has f/u 7/7.  Will do NST twice weekly now.  Discussed PTL precautions as contractions on monitor but pt not feeling them.

## 2021-06-25 NOTE — PROGRESS NOTES
Pt here today for OB follow up with NST  Reports +FM  WT: 181 lb  BP: 111/61  Preferred pharmacy verified with pt.  Pt states no complaints or concerns today  Saw [facility] on 06/22 has next appt on 07/07/2021  Good # [phone]

## 2021-06-28 ENCOUNTER — ROUTINE PRENATAL (OUTPATIENT)
Dept: OBGYN | Facility: CLINIC | Age: 26
End: 2021-06-28
Payer: COMMERCIAL

## 2021-06-28 DIAGNOSIS — O36.5930 POOR FETAL GROWTH AFFECTING MANAGEMENT OF MOTHER IN THIRD TRIMESTER, SINGLE OR UNSPECIFIED FETUS: ICD-10-CM

## 2021-06-28 PROCEDURE — 59025 FETAL NON-STRESS TEST: CPT | Performed by: OBSTETRICS & GYNECOLOGY

## 2021-07-01 ENCOUNTER — ROUTINE PRENATAL (OUTPATIENT)
Dept: OBGYN | Facility: CLINIC | Age: 26
End: 2021-07-01
Payer: COMMERCIAL

## 2021-07-01 DIAGNOSIS — O36.5930 POOR FETAL GROWTH AFFECTING MANAGEMENT OF MOTHER IN THIRD TRIMESTER, SINGLE OR UNSPECIFIED FETUS: ICD-10-CM

## 2021-07-01 PROCEDURE — 59025 FETAL NON-STRESS TEST: CPT | Performed by: OBSTETRICS & GYNECOLOGY

## 2021-07-06 ENCOUNTER — ROUTINE PRENATAL (OUTPATIENT)
Dept: OBGYN | Facility: CLINIC | Age: 26
End: 2021-07-06
Payer: COMMERCIAL

## 2021-07-06 DIAGNOSIS — O36.5930 POOR FETAL GROWTH AFFECTING MANAGEMENT OF MOTHER IN THIRD TRIMESTER, SINGLE OR UNSPECIFIED FETUS: ICD-10-CM

## 2021-07-06 PROCEDURE — 59025 FETAL NON-STRESS TEST: CPT | Performed by: OBSTETRICS & GYNECOLOGY

## 2021-07-06 NOTE — PROCEDURES
NST: Baseline 130 with moderate variability, accelerations present, no decelerations noted.  No uterine activity noted.  Indication IUGR.

## 2021-07-08 ENCOUNTER — ROUTINE PRENATAL (OUTPATIENT)
Dept: OBGYN | Facility: CLINIC | Age: 26
End: 2021-07-08
Payer: COMMERCIAL

## 2021-07-08 DIAGNOSIS — O36.5930 POOR FETAL GROWTH AFFECTING MANAGEMENT OF MOTHER IN THIRD TRIMESTER, SINGLE OR UNSPECIFIED FETUS: ICD-10-CM

## 2021-07-08 PROCEDURE — 59025 FETAL NON-STRESS TEST: CPT | Performed by: OBSTETRICS & GYNECOLOGY

## 2021-07-09 ENCOUNTER — ROUTINE PRENATAL (OUTPATIENT)
Dept: OBGYN | Facility: CLINIC | Age: 26
End: 2021-07-09
Payer: COMMERCIAL

## 2021-07-09 ENCOUNTER — HOSPITAL ENCOUNTER (OUTPATIENT)
Facility: MEDICAL CENTER | Age: 26
End: 2021-07-09
Attending: OBSTETRICS & GYNECOLOGY
Payer: COMMERCIAL

## 2021-07-09 VITALS — DIASTOLIC BLOOD PRESSURE: 68 MMHG | BODY MASS INDEX: 29.22 KG/M2 | WEIGHT: 184 LBS | SYSTOLIC BLOOD PRESSURE: 107 MMHG

## 2021-07-09 DIAGNOSIS — O36.5930 POOR FETAL GROWTH AFFECTING MANAGEMENT OF MOTHER IN THIRD TRIMESTER, SINGLE OR UNSPECIFIED FETUS: ICD-10-CM

## 2021-07-09 PROCEDURE — 87150 DNA/RNA AMPLIFIED PROBE: CPT

## 2021-07-09 PROCEDURE — 87081 CULTURE SCREEN ONLY: CPT

## 2021-07-09 PROCEDURE — 90040 PR PRENATAL FOLLOW UP: CPT | Performed by: OBSTETRICS & GYNECOLOGY

## 2021-07-10 LAB — GP B STREP DNA SPEC QL NAA+PROBE: NEGATIVE

## 2021-07-12 ENCOUNTER — ROUTINE PRENATAL (OUTPATIENT)
Dept: OBGYN | Facility: CLINIC | Age: 26
End: 2021-07-12
Payer: COMMERCIAL

## 2021-07-12 DIAGNOSIS — O36.5930 POOR FETAL GROWTH AFFECTING MANAGEMENT OF MOTHER IN THIRD TRIMESTER, SINGLE OR UNSPECIFIED FETUS: ICD-10-CM

## 2021-07-12 PROCEDURE — 59025 FETAL NON-STRESS TEST: CPT | Performed by: OBSTETRICS & GYNECOLOGY

## 2021-07-15 ENCOUNTER — ROUTINE PRENATAL (OUTPATIENT)
Dept: OBGYN | Facility: CLINIC | Age: 26
End: 2021-07-15
Payer: COMMERCIAL

## 2021-07-15 VITALS — WEIGHT: 186 LBS | SYSTOLIC BLOOD PRESSURE: 115 MMHG | BODY MASS INDEX: 29.54 KG/M2 | DIASTOLIC BLOOD PRESSURE: 63 MMHG

## 2021-07-15 DIAGNOSIS — O36.5930 POOR FETAL GROWTH AFFECTING MANAGEMENT OF MOTHER IN THIRD TRIMESTER, SINGLE OR UNSPECIFIED FETUS: ICD-10-CM

## 2021-07-15 PROCEDURE — 90040 PR PRENATAL FOLLOW UP: CPT | Performed by: OBSTETRICS & GYNECOLOGY

## 2021-07-15 NOTE — PROGRESS NOTES
OB Followup;    37w1d    Patient Active Problem List    Diagnosis Date Noted   • 28 weeks gestation of pregnancy 2021   • Poor fetal growth affecting management of mother in third trimester 2021       Vitals:    07/15/21 0901   BP: 115/63   Weight: 84.4 kg (186 lb)       Patient presents for followup of OB care. Currently doing well . Good fetal movement no leakage of fluid no contractions or vaginal bleeding        Size equals dates, normal fetal heart rate      Labs; GBS negative    History of FGR with growth at 3rd percentile normal Dopplers JOSELYN 20 with most recent scan from [facility].  Recommendations from [facility]-induction of labor at 38 to 39 weeks  Patient scheduled for induction of labor on   NST-reactive without decelerations or contractions    Continue twice weekly NSTs until delivery    Labor precautions given  Discussed proper weight gain during pregnancy.    Signs and symptoms of labor/ labor discussed   Discussed our group's policy on prenatal checkups and delivery  Discussed Covid precautions  Discussed Covid vaccination recommendations from CDC/ACOG  Discussed proper exercise during pregnancy  Discussed proper oral fluid hydration  Currently taking prenatal vitamins as instructed  Reviewed fetal kick counts and appropriate fetal movement during pregnancy  Reviewed postpartum birth control methods  All questions answered in detail    Followup in  1 weeks

## 2021-07-19 ENCOUNTER — ROUTINE PRENATAL (OUTPATIENT)
Dept: OBGYN | Facility: CLINIC | Age: 26
End: 2021-07-19
Payer: COMMERCIAL

## 2021-07-19 DIAGNOSIS — O36.5930 POOR FETAL GROWTH AFFECTING MANAGEMENT OF MOTHER IN THIRD TRIMESTER, SINGLE OR UNSPECIFIED FETUS: ICD-10-CM

## 2021-07-19 PROCEDURE — 99999 PR NO CHARGE: CPT | Performed by: OBSTETRICS & GYNECOLOGY

## 2021-07-22 ENCOUNTER — ROUTINE PRENATAL (OUTPATIENT)
Dept: OBGYN | Facility: CLINIC | Age: 26
End: 2021-07-22
Payer: COMMERCIAL

## 2021-07-22 VITALS — SYSTOLIC BLOOD PRESSURE: 115 MMHG | DIASTOLIC BLOOD PRESSURE: 76 MMHG | WEIGHT: 185 LBS | BODY MASS INDEX: 29.38 KG/M2

## 2021-07-22 DIAGNOSIS — O36.5930 POOR FETAL GROWTH AFFECTING MANAGEMENT OF MOTHER IN THIRD TRIMESTER, SINGLE OR UNSPECIFIED FETUS: ICD-10-CM

## 2021-07-22 DIAGNOSIS — O09.93 HIGH-RISK PREGNANCY SUPERVISION, THIRD TRIMESTER: ICD-10-CM

## 2021-07-22 LAB
NST ACOUSTIC STIMULATION: NO
NST ACTION NECESSARY: NORMAL
NST ASSESSMENT: NORMAL
NST BASELINE: NORMAL
NST INDICATIONS: NORMAL
NST OTHER DATA: NORMAL
NST READ BY: NORMAL
NST RETURN: NORMAL
NST UTERINE ACTIVITY: NORMAL

## 2021-07-22 PROCEDURE — 90040 PR PRENATAL FOLLOW UP: CPT | Performed by: OBSTETRICS & GYNECOLOGY

## 2021-07-22 NOTE — PROGRESS NOTES
S: Pt presents for routine OB follow up and nonstress test.  Reports good fetal movement.  No contractions, vaginal bleeding, or leakage of fluid.  Denies headaches or scotoma.  Reports normal bowel and bladder functions.  Patient has induction of labor scheduled at 39 weeks gestation  Questions answered.    O: LMP 10/28/2020   Patients' weight gain, fluid intake and exercise level discussed.  Vitals, fundal height , fetal position, and FHR reviewed on flowsheet    Lab:  Recent Results (from the past 336 hour(s))   POCT Fetal Nonstress Test    Collection Time: 21  3:00 PM   Result Value Ref Range    NST Indications IUGR     NST Baseline 130s     NST Uterine Activity no     NST Acoustic Stimulation no     NST Assessment Reactive NST. No decelerations     NST Action Necessary      NST Other Data      NST Return      NST Read By REID ASTORGA [[id number]]    GRP B STREP, BY PCR (MCLEOD BROTH)    Collection Time: 21 10:15 AM    Specimen: Genital   Result Value Ref Range    Strep Gp B DNA PCR Negative Negative   POCT Fetal Nonstress Test    Collection Time: 21  1:50 PM   Result Value Ref Range    NST Indications      NST Baseline      NST Uterine Activity      NST Acoustic Stimulation      NST Assessment      NST Action Necessary      NST Other Data      NST Return      NST Read By         A/P:  26 y.o.  at 38w1d presents for routine obstetric follow-up.  Doing well currently  Size less than dates due to IUGR-last ultrasound [facility] from  shows growth percentile of 8% with normal JOSELYN and Dopplers.  NST is reactive today.  Induction of labor scheduled at 39 weeks and instructions were provided  Encounter Diagnoses   Name Primary?   • High-risk pregnancy supervision, third trimester    • Poor fetal growth affecting management of mother in third trimester, single or unspecified fetus          1.  Continue prenatal vitamins.  2.  Fetal kick counts daily reviewed.  3.   Exercise at least 30 minutes daily.  4.  Drink at least 2L of water daily  5.  Labor precautions educated.  6.  Follow-up for twice-weekly NST until delivery  7.  GBS culture is negative

## 2021-07-24 ENCOUNTER — HOSPITAL ENCOUNTER (INPATIENT)
Facility: MEDICAL CENTER | Age: 26
LOS: 1 days | End: 2021-07-25
Attending: OBSTETRICS & GYNECOLOGY | Admitting: OBSTETRICS & GYNECOLOGY
Payer: COMMERCIAL

## 2021-07-24 ENCOUNTER — HOSPITAL ENCOUNTER (EMERGENCY)
Facility: MEDICAL CENTER | Age: 26
End: 2021-07-24
Attending: OBSTETRICS & GYNECOLOGY | Admitting: OBSTETRICS & GYNECOLOGY
Payer: COMMERCIAL

## 2021-07-24 VITALS
HEART RATE: 81 BPM | OXYGEN SATURATION: 100 % | WEIGHT: 185 LBS | TEMPERATURE: 98 F | HEIGHT: 66 IN | SYSTOLIC BLOOD PRESSURE: 134 MMHG | BODY MASS INDEX: 29.73 KG/M2 | RESPIRATION RATE: 18 BRPM | DIASTOLIC BLOOD PRESSURE: 86 MMHG

## 2021-07-24 LAB
BASOPHILS # BLD AUTO: 0.3 % (ref 0–1.8)
BASOPHILS # BLD: 0.04 K/UL (ref 0–0.12)
EOSINOPHIL # BLD AUTO: 0.03 K/UL (ref 0–0.51)
EOSINOPHIL NFR BLD: 0.2 % (ref 0–6.9)
ERYTHROCYTE [DISTWIDTH] IN BLOOD BY AUTOMATED COUNT: 43 FL (ref 35.9–50)
ERYTHROCYTE [DISTWIDTH] IN BLOOD BY AUTOMATED COUNT: 43.7 FL (ref 35.9–50)
HCT VFR BLD AUTO: 37 % (ref 37–47)
HCT VFR BLD AUTO: 46 % (ref 37–47)
HGB BLD-MCNC: 12.7 G/DL (ref 12–16)
HGB BLD-MCNC: 15.3 G/DL (ref 12–16)
HOLDING TUBE BB 8507: NORMAL
IMM GRANULOCYTES # BLD AUTO: 0.05 K/UL (ref 0–0.11)
IMM GRANULOCYTES NFR BLD AUTO: 0.4 % (ref 0–0.9)
LYMPHOCYTES # BLD AUTO: 2.51 K/UL (ref 1–4.8)
LYMPHOCYTES NFR BLD: 19.7 % (ref 22–41)
MCH RBC QN AUTO: 30.1 PG (ref 27–33)
MCH RBC QN AUTO: 30.5 PG (ref 27–33)
MCHC RBC AUTO-ENTMCNC: 33.3 G/DL (ref 33.6–35)
MCHC RBC AUTO-ENTMCNC: 34.3 G/DL (ref 33.6–35)
MCV RBC AUTO: 88.9 FL (ref 81.4–97.8)
MCV RBC AUTO: 90.6 FL (ref 81.4–97.8)
MONOCYTES # BLD AUTO: 0.61 K/UL (ref 0–0.85)
MONOCYTES NFR BLD AUTO: 4.8 % (ref 0–13.4)
NEUTROPHILS # BLD AUTO: 9.51 K/UL (ref 2–7.15)
NEUTROPHILS NFR BLD: 74.6 % (ref 44–72)
NRBC # BLD AUTO: 0 K/UL
NRBC BLD-RTO: 0 /100 WBC
PLATELET # BLD AUTO: 217 K/UL (ref 164–446)
PLATELET # BLD AUTO: 248 K/UL (ref 164–446)
PMV BLD AUTO: 11.3 FL (ref 9–12.9)
PMV BLD AUTO: 12.1 FL (ref 9–12.9)
RBC # BLD AUTO: 4.16 M/UL (ref 4.2–5.4)
RBC # BLD AUTO: 5.08 M/UL (ref 4.2–5.4)
SARS-COV+SARS-COV-2 AG RESP QL IA.RAPID: NOTDETECTED
SPECIMEN SOURCE: NORMAL
WBC # BLD AUTO: 10.9 K/UL (ref 4.8–10.8)
WBC # BLD AUTO: 12.8 K/UL (ref 4.8–10.8)

## 2021-07-24 PROCEDURE — 36415 COLL VENOUS BLD VENIPUNCTURE: CPT

## 2021-07-24 PROCEDURE — 0HQ9XZZ REPAIR PERINEUM SKIN, EXTERNAL APPROACH: ICD-10-PCS | Performed by: OBSTETRICS & GYNECOLOGY

## 2021-07-24 PROCEDURE — 304965 HCHG RECOVERY SERVICES

## 2021-07-24 PROCEDURE — 700101 HCHG RX REV CODE 250

## 2021-07-24 PROCEDURE — 85027 COMPLETE CBC AUTOMATED: CPT

## 2021-07-24 PROCEDURE — 700111 HCHG RX REV CODE 636 W/ 250 OVERRIDE (IP): Performed by: OBSTETRICS & GYNECOLOGY

## 2021-07-24 PROCEDURE — 59025 FETAL NON-STRESS TEST: CPT

## 2021-07-24 PROCEDURE — 700111 HCHG RX REV CODE 636 W/ 250 OVERRIDE (IP)

## 2021-07-24 PROCEDURE — 87426 SARSCOV CORONAVIRUS AG IA: CPT

## 2021-07-24 PROCEDURE — 770002 HCHG ROOM/CARE - OB PRIVATE (112)

## 2021-07-24 PROCEDURE — 59409 OBSTETRICAL CARE: CPT

## 2021-07-24 PROCEDURE — 85025 COMPLETE CBC W/AUTO DIFF WBC: CPT

## 2021-07-24 PROCEDURE — 59410 OBSTETRICAL CARE: CPT | Mod: GC | Performed by: OBSTETRICS & GYNECOLOGY

## 2021-07-24 PROCEDURE — 302449 STATCHG TRIAGE ONLY (STATISTIC)

## 2021-07-24 RX ORDER — LIDOCAINE HYDROCHLORIDE 10 MG/ML
INJECTION, SOLUTION INFILTRATION; PERINEURAL
Status: COMPLETED
Start: 2021-07-24 | End: 2021-07-24

## 2021-07-24 RX ORDER — HYDROXYZINE HYDROCHLORIDE 25 MG/1
50 TABLET, FILM COATED ORAL EVERY 6 HOURS PRN
Status: DISCONTINUED | OUTPATIENT
Start: 2021-07-24 | End: 2021-07-25 | Stop reason: HOSPADM

## 2021-07-24 RX ORDER — SODIUM CHLORIDE, SODIUM LACTATE, POTASSIUM CHLORIDE, CALCIUM CHLORIDE 600; 310; 30; 20 MG/100ML; MG/100ML; MG/100ML; MG/100ML
INJECTION, SOLUTION INTRAVENOUS CONTINUOUS
Status: ACTIVE | OUTPATIENT
Start: 2021-07-24 | End: 2021-07-24

## 2021-07-24 RX ORDER — ALUMINA, MAGNESIA, AND SIMETHICONE 2400; 2400; 240 MG/30ML; MG/30ML; MG/30ML
30 SUSPENSION ORAL EVERY 6 HOURS PRN
Status: DISCONTINUED | OUTPATIENT
Start: 2021-07-24 | End: 2021-07-25 | Stop reason: HOSPADM

## 2021-07-24 RX ORDER — ONDANSETRON 4 MG/1
4 TABLET, ORALLY DISINTEGRATING ORAL EVERY 6 HOURS PRN
Status: DISCONTINUED | OUTPATIENT
Start: 2021-07-24 | End: 2021-07-25 | Stop reason: HOSPADM

## 2021-07-24 RX ORDER — SODIUM CHLORIDE, SODIUM LACTATE, POTASSIUM CHLORIDE, CALCIUM CHLORIDE 600; 310; 30; 20 MG/100ML; MG/100ML; MG/100ML; MG/100ML
INJECTION, SOLUTION INTRAVENOUS PRN
Status: DISCONTINUED | OUTPATIENT
Start: 2021-07-24 | End: 2021-07-25 | Stop reason: HOSPADM

## 2021-07-24 RX ORDER — ACETAMINOPHEN 500 MG
1000 TABLET ORAL EVERY 6 HOURS PRN
Status: DISCONTINUED | OUTPATIENT
Start: 2021-07-24 | End: 2021-07-25 | Stop reason: HOSPADM

## 2021-07-24 RX ORDER — IBUPROFEN 800 MG/1
800 TABLET ORAL EVERY 8 HOURS PRN
Status: DISCONTINUED | OUTPATIENT
Start: 2021-07-24 | End: 2021-07-25 | Stop reason: HOSPADM

## 2021-07-24 RX ORDER — MISOPROSTOL 200 UG/1
800 TABLET ORAL
Status: DISCONTINUED | OUTPATIENT
Start: 2021-07-24 | End: 2021-07-25 | Stop reason: HOSPADM

## 2021-07-24 RX ORDER — ONDANSETRON 2 MG/ML
4 INJECTION INTRAMUSCULAR; INTRAVENOUS EVERY 6 HOURS PRN
Status: DISCONTINUED | OUTPATIENT
Start: 2021-07-24 | End: 2021-07-25 | Stop reason: HOSPADM

## 2021-07-24 RX ADMIN — LIDOCAINE HYDROCHLORIDE 20 ML: 10 INJECTION, SOLUTION INFILTRATION; PERINEURAL at 10:50

## 2021-07-24 RX ADMIN — OXYTOCIN 125 ML/HR: 10 INJECTION, SOLUTION INTRAMUSCULAR; INTRAVENOUS at 11:23

## 2021-07-24 RX ADMIN — OXYTOCIN 2000 ML/HR: 10 INJECTION, SOLUTION INTRAMUSCULAR; INTRAVENOUS at 10:40

## 2021-07-24 ASSESSMENT — PAIN DESCRIPTION - PAIN TYPE
TYPE: ACUTE PAIN

## 2021-07-24 ASSESSMENT — COPD QUESTIONNAIRES
HAVE YOU SMOKED AT LEAST 100 CIGARETTES IN YOUR ENTIRE LIFE: NO/DON'T KNOW
IN THE PAST 12 MONTHS DO YOU DO LESS THAN YOU USED TO BECAUSE OF YOUR BREATHING PROBLEMS: DISAGREE/UNSURE
DURING THE PAST 4 WEEKS HOW MUCH DID YOU FEEL SHORT OF BREATH: NONE/LITTLE OF THE TIME
COPD SCREENING SCORE: 0
DO YOU EVER COUGH UP ANY MUCUS OR PHLEGM?: NO/ONLY WITH OCCASIONAL COLDS OR INFECTIONS

## 2021-07-24 ASSESSMENT — LIFESTYLE VARIABLES
ALCOHOL_USE: NO
HAVE YOU EVER FELT YOU SHOULD CUT DOWN ON YOUR DRINKING: NO
HAVE PEOPLE ANNOYED YOU BY CRITICIZING YOUR DRINKING: NO
HOW MANY TIMES IN THE PAST YEAR HAVE YOU HAD 5 OR MORE DRINKS IN A DAY: 0
TOTAL SCORE: 0
DOES PATIENT WANT TO STOP DRINKING: NO
TOTAL SCORE: 0
AVERAGE NUMBER OF DAYS PER WEEK YOU HAVE A DRINK CONTAINING ALCOHOL: 0
CONSUMPTION TOTAL: NEGATIVE
ON A TYPICAL DAY WHEN YOU DRINK ALCOHOL HOW MANY DRINKS DO YOU HAVE: 0
EVER HAD A DRINK FIRST THING IN THE MORNING TO STEADY YOUR NERVES TO GET RID OF A HANGOVER: NO
TOTAL SCORE: 0
EVER FELT BAD OR GUILTY ABOUT YOUR DRINKING: NO

## 2021-07-24 ASSESSMENT — PATIENT HEALTH QUESTIONNAIRE - PHQ9
SUM OF ALL RESPONSES TO PHQ9 QUESTIONS 1 AND 2: 0
2. FEELING DOWN, DEPRESSED, IRRITABLE, OR HOPELESS: NOT AT ALL
1. LITTLE INTEREST OR PLEASURE IN DOING THINGS: NOT AT ALL

## 2021-07-24 NOTE — PROGRESS NOTES
Report received from Bri Thomas  1530- report to Chip GUZMAN, orders to recheck pt. If no change can d/c pt home.   0800- sve 3/80/-2 no change. Call to Dr. Garcia, orders to d/c home.   0807- d/c education reviewed with pt and SO. No questions at this time.

## 2021-07-24 NOTE — PROGRESS NOTES
Pt up to the bathroom with assistance. Pt able to void, sebastián care done, pad given, gown changed.

## 2021-07-24 NOTE — PROGRESS NOTES
1000: Report from JHON Qiu reviewed, pt is complete and pushing.     1039: Pt delivered baby girl with APGARS 8/9, tactile stimulation done, baby is tolerating adjusting to life.     1125: Second bag of pit, baby is latched.      1200: Pt is tolerating fundal rubs. Does not want anything for pain.     1255: Report given to nathan,  used (Clovis [id number]).

## 2021-07-24 NOTE — L&D DELIVERY NOTE
OB Vaginal Delivery Note    2021  Thao Pate  26 y.o.    Patient was admitted to Labor and Delivery at 38w3d for active labor    Review the Delivery Report for details.     Gestational Age: 38w3d  /Para:   Labor Complications: None   Blood Loss:   Delivery Blood Loss  21 2239 - 21 1108    Est. Blood Loss Hospital Encounter 300 mL    Total  300 mL        Delivery Type: Vaginal, Spontaneous   ROM to Delivery Time: 0h 09m   Sex: Female  Umpire Weight: Pending 2/2 skin to skin   1 Minute 5 Minute 10 Minute   Apgar Totals: 8   9          Delivery Details:  Thao Pate, a 26 y.o.  female delivered a viable Female infant with Apgars of 8  and 9 . The patient was put in the dorsal lithotomy position. Delivery was via Vaginal, Spontaneous  to a sterile field under None  anesthesia. Infant delivered in Vertex    Occiput  Anterior  position. Anterior and posterior shoulders delivered without difficulty.    The cord was clamped twice, cut once and 3 Vessels  were noted. No cord complications.  Intact  placenta delivered at 2021 10:42 AM . Placental disposition: discarded . Fundal massage performed and fundus found to be firm. Perineum, vagina, cervix were inspected, and   the following lacerations were noted and repaired by Domonique Shirley MD:    Padilla Enriquez Girl [[id number]]    Lacerations    Episiotomy: None    Perineal lacerations: 1st Repaired?: Yes   Perineal suture type: 3-0 Vicryl   Vaginal laceration?: No    Cervical laceration?: No    Number of repair packets: 1          Excellent hemostasis was noted. Needle count correct.    Infant and patient in delivery room in good and stable condition.     Domonique Shirley MD, present for the entirety of the delivery.    Jose Cruz Garcia M.D.PGY1

## 2021-07-24 NOTE — PROGRESS NOTES
0645: pt to labor and delivery c/o uc's.  efm and toco applied. Vss.  enrique 3/80/-2.  Report to amado pelletier

## 2021-07-24 NOTE — H&P
OB H&P:    CC: Active labor    HPI:  Ms. Thao Pate is a 26 y.o.  @ 38w3d by LMP c/w 10 week ultrasound who presents to L&D in precipitous labor. The pt had presented previously at triage at 0600 and was unchanged at the hour check. (2 cm dilated). She represented 1 hour after discharge dilated to 9 cm. She was roomed and IV was placed. Her previous child was born without an epidural.    Contractions: Yes   Loss of fluid: No   Vaginal bleeding: No   Fetal movement: present      PNC with RWH    PNL:  Rh+, R immune, HIV neg, TrepAb neg, HBsAg NR, GC/CT neg/neg  Glucola: 106  GBS: Negative      ROS:  Const: denies fevers, general concerns  CV/resp: reports no concerns  GI: denies abd pain, GI concerns  : see HPI  Neuro: denies HA/vision changes    OB History    Para Term  AB Living   2 1 1     1   SAB TAB Ectopic Molar Multiple Live Births             1      # Outcome Date GA Lbr Amrit/2nd Weight Sex Delivery Anes PTL Lv   2 Current            1 Term 16 40w0d  2.9 kg (6 lb 6.3 oz) F Vag-Spont   BISI      Birth Comments: Pt states no complications          PMHx: Denies  PSHx: Denies    No current facility-administered medications on file prior to encounter.     Current Outpatient Medications on File Prior to Encounter   Medication Sig Dispense Refill   • FOLIC ACID PO Take  by mouth.     • Prenatal MV-Min-Fe Fum-FA-DHA (PRENATAL 1 PO) Take  by mouth.         Family History   Problem Relation Age of Onset   • No Known Problems Mother    • No Known Problems Father    • No Known Problems Sister    • No Known Problems Brother    • Cancer Maternal Grandmother    • Cancer Maternal Grandfather    • No Known Problems Paternal Grandmother    • Alzheimer's Disease Paternal Grandfather    • No Known Problems Brother    • No Known Problems Sister    • No Known Problems Sister        Social History     Tobacco Use   • Smoking status: Never Smoker   • Smokeless tobacco: Never Used   Vaping Use  "  • Vaping Use: Never used   Substance Use Topics   • Alcohol use: Never   • Drug use: Never         PE:  Vitals:    21 0900 21 1053   BP:  (!) 172/66   Pulse:  100   Weight: 83.9 kg (184 lb 15.5 oz)    Height: 1.676 m (5' 6\")      gen: AAO, NAD  Resp: Unlabored breathing on RA  abd: soft, gravid, NT, EFW 3000  Ext: NT, No edema    SVE: 9/100/-1 on admission  FHT: 150/moderate variability/+ accels/ no decels. Reactive tracing  Mateus: Contractions every 2-4 minutes    A/P: 26 y.o.  @ 38w3d by LMP here in precipitous labor after an unchanged cervix earlier this AM.    Plan:  AROM  Expectant mgmt.        Jose Cruz Garcia M.D.PGY1        "

## 2021-07-25 VITALS
WEIGHT: 184.97 LBS | OXYGEN SATURATION: 96 % | HEART RATE: 74 BPM | SYSTOLIC BLOOD PRESSURE: 114 MMHG | HEIGHT: 66 IN | RESPIRATION RATE: 17 BRPM | TEMPERATURE: 97.9 F | BODY MASS INDEX: 29.73 KG/M2 | DIASTOLIC BLOOD PRESSURE: 72 MMHG

## 2021-07-25 RX ORDER — IBUPROFEN 800 MG/1
800 TABLET ORAL EVERY 8 HOURS PRN
Qty: 30 TABLET | Refills: 0 | Status: SHIPPED | OUTPATIENT
Start: 2021-07-25

## 2021-07-25 RX ORDER — ACETAMINOPHEN 500 MG
1000 TABLET ORAL EVERY 6 HOURS PRN
Qty: 30 TABLET | Refills: 0 | Status: SHIPPED | OUTPATIENT
Start: 2021-07-25

## 2021-07-25 ASSESSMENT — EDINBURGH POSTNATAL DEPRESSION SCALE (EPDS)
I HAVE FELT SAD OR MISERABLE: NO, NOT AT ALL
I HAVE LOOKED FORWARD WITH ENJOYMENT TO THINGS: RATHER LESS THAN I USED TO
THINGS HAVE BEEN GETTING ON TOP OF ME: NO, MOST OF THE TIME I HAVE COPED QUITE WELL
I HAVE BEEN SO UNHAPPY THAT I HAVE HAD DIFFICULTY SLEEPING: NOT VERY OFTEN
THE THOUGHT OF HARMING MYSELF HAS OCCURRED TO ME: NEVER
I HAVE BEEN SO UNHAPPY THAT I HAVE BEEN CRYING: NO, NEVER
I HAVE BEEN ABLE TO LAUGH AND SEE THE FUNNY SIDE OF THINGS: AS MUCH AS I ALWAYS COULD
I HAVE FELT SCARED OR PANICKY FOR NO GOOD REASON: NO, NOT MUCH
I HAVE BLAMED MYSELF UNNECESSARILY WHEN THINGS WENT WRONG: NOT VERY OFTEN
I HAVE BEEN ANXIOUS OR WORRIED FOR NO GOOD REASON: NO, NOT AT ALL

## 2021-07-25 ASSESSMENT — PAIN DESCRIPTION - PAIN TYPE: TYPE: ACUTE PAIN

## 2021-07-25 NOTE — PROGRESS NOTES
Name:  Clovis   ID Number:  [id number]    Content Discussed:  Received bedside report from Dorothy Meadows and oriented patient and father of baby to room.

## 2021-07-25 NOTE — PROGRESS NOTES
Name:  Wander   ID Number:  [id number]    Content Discussed:  Answered patient's questions regarding if father of baby can bring her food from outside of hospital. Educated patient and father of baby that she does not have any dietary restrictions.

## 2021-07-25 NOTE — PROGRESS NOTES
1255-Patient received from labor and delivery via wheelchair to room S319. Bedside report received from Dorothy Meadows RN using Language line  via I-pad Clovis [id number] , assumed care of patient.  Patient oriented to room and surroundings, call system, visiting policy, infant security including ID bands, not letting anyone take infant without photo ID, no sleeping with infant, no carrying infant in halls, no leaving infant unattended.  0-10 pain scale and pain management discussed.  Patient instructed to call for assistance before getting out of bed until stable.  Assessment done.

## 2021-07-25 NOTE — PROGRESS NOTES
Name:  Chalo   ID Number:  [id number]    Content Discussed:  Discussed plan of care for patient and infant. Discussed breast feeding and educated patient to breast feed infant when infant is showing feeding cues and to call for RN to assess latch. Patient educated to offer infant breast feeding at least every 3 hours if infant is not showing feeding cues sooner.

## 2021-07-25 NOTE — CARE PLAN
The patient is Stable - Low risk of patient condition declining or worsening    Shift Goals  Clinical Goals: Patient will state adequate pain relief    Progress made toward(s) clinical / shift goals:  Patient rates uterine cramping 1 out of 10 and denies need for PO analgesia    Patient is not progressing towards the following goals:

## 2021-07-25 NOTE — LACTATION NOTE
This note was copied from a baby's chart.  @0905 LC spoke with MOB with assistance from IPad  Nikita ([id number]), MOB was breastfeeding when LC arrived, she reports some discomfort when she breastfeeds, explained the importance of a deep latch, MOB agreed to allow LC to assist with latch attempt, with minimal assistance a deep latch and nutritive suck were achieved, MOB denies discomfort feeding with a deeper latch, baby has voided and stooled, current weight loss is 2.19%, baby was 38.3 weeks gestation at delivery, encouraged ad michel breastfeeding at least Q 4 hours (more if feeding cues noted), educated on expected urine and stool output as well as infant stool changes as maternal milk comes in    Written and verbal information provided on outpatient breastfeeding assistance available at [facility] after discharge and encouraged to call to schedule consult as needed, informed that [facility] [place] is on hold for the time being, zoom meeting information provided as well    MOB denies having any additional questions or concerns for LC at this time    Encouraged to call for assistance as needed

## 2021-07-25 NOTE — CARE PLAN
The patient is Stable - Low risk of patient condition declining or worsening    Shift Goals  Clinical Goals: bleeding WDL  Patient Goals: pain controllled  Family Goals: bond    Progress made toward(s) clinical / shift goals:    Problem: Knowledge Deficit - Standard  Goal: Patient and family/care givers will demonstrate understanding of plan of care, disease process/condition, diagnostic tests and medications  Outcome: Progressing     Problem: Pain - Standard  Goal: Alleviation of pain or a reduction in pain to the patient’s comfort goal  Outcome: Progressing     Problem: Knowledge Deficit - Postpartum  Goal: Patient will verbalize and demonstrate understanding of self and infant care  Outcome: Progressing     Problem: Psychosocial - Postpartum  Goal: Patient will verbalize and demonstrate effective bonding and parenting behavior  Outcome: Progressing     Problem: Altered Physiologic Condition  Goal: Patient physiologically stable as evidenced by normal lochia, palpable uterine involution and vitals within normal limits  Outcome: Progressing     Problem: Infection - Postpartum  Goal: Postpartum patient will be free of signs and symptoms of infection  Outcome: Progressing       Patient is not progressing towards the following goals:

## 2021-07-25 NOTE — PROGRESS NOTES
Assessment done. Pt. Has no c/o pain.Fundus firm.Lochia light. Fob at bedside,supportive.Pt. Caring for baby with good skill. Breastfeeding going well.

## 2021-07-25 NOTE — DISCHARGE INSTRUCTIONS
DISCHARGE INSTRUCTIONS (Khmer) POSTPARTUM FOR MOM      DRAGAN LAS ABRIL:  · Antes de tocar el bebé.  · Antes del amamantamiento o darle al bebé lactancia artificial.  · Después de usar el baño.    CUIDADO DE LAS HERIDAS:  · La Incisión de la Operación Cesárea:  Mantenga limpea y seca, NO levante nada que pesa más que olsen bebé por 6 semenas.  No debe ninguna apertura ni pus.  · Episiotomía/Kaur Vaginal:  Use un spray de Tucks o Dermoplast, tome un baño de asiento cuando necesite (6 pulgadas), siga usando la botella Vikki hasta que pare de sangrar.    CUIDADA DEL SENO:  · Lleve un sostén.  · Si esta` amamantando no use jabón en el seno ni en los pezones.  · Aplique lanolina si los pezones se ponen quebrazados y si le duelen.    CUIDADO DE LA VAGINA:  · [place] puede entrar la vagina por 6 semanas:  Actividad sexual, Ducha vaginal, Tampones.  · El sangramiento puede seguir por 2 a 4 semanas.  La cantidad es variable.  Llame a olsen med`ico(a) obstetra si hay mucha mahogany (si usa ma`s de malaika toalla femenina cada hora).    CONTRACEPCIÒN:  · Es posible embarazarse en cualquier tiempo despus del parto y mientras el amamantamiento.  · Debe planear de discutir los metodos de cantracepción con olsen médico(a) cuando vuelva en 6 semanas para olsen revisión médica.    DIETA Y DEFECACÒN:  · Para evitar la constipación coma mas fibra (cereal salvado, fruta, y verduras) Y tome muchos liquidos.   · Es común orinar frecuentemente después del parto.    POSTPARTO Y LA DEPRESÒN:  Nya los primeros weiss después del parto es común sentirse:  · Impaciente, irritable, o llorar  Estos sentimientos llegan y van rapidamente.  No obstante, georgie malaika de cada rayo mujeres tiene síntomas emocionales conocidos kody depresión postparto.  · Despresión Postparto:  Puede empezar tan pronto kody el derrek o tercer día después del parto o puede durar algunas semanas o meses para desarrollar.  Síntomas de la depresión pueden presentarse, marquez son más  intensos:  · Pérdida del hambre  · Frecuencia de llorar  · Sentirse desesperada o perder el control  · Demasiada o no suficiente preocupación con olsen bebé  · Tener miedo de tocar el bebé  · No preocuparse con olsen propia apariencia  · Incapacidad de dormir o dormir excesivamente    DEPRESIÒN / RIESGO AL SUICIDIO:    Cuando se le da de nikki de alguna entidad de [facility], es importante aprender a mantener a jeffrey de hacerse daño a sí mismo.    Reconocer los signos de advertencia:  · Cambios bruscos en la peronalidad, positiva o negativa, incluyendo aumento de la energia  · Regalar posesiones  · Cambios en patrones de comer - significativos cambios de peso - positivos o negativos  · Cambio de patrones para dormir - no poder dormir o dormir todo el tiempo  · Falta de voluntad o incapacidad de comunicarse  · Depresión  · Shelia, desánimo y tristeza inusual  · Habla de querer morir  · Descuido del aspecto personal  · Rebeldía - comportamiento imprudente  · Retiro de personas y actividades que les gusta  · Confusión-incapacidad para concentrarse    Si usted o un ser querido observa cualquiera de estos comportamientos o tiene preocupaciones de hacerse daño a si mismo, aquí le damos lo que usted puede hacer:  · Hablar de ti - tus sentimientos y razones para hacerse krsi a si mismo  · Retire cualquier medio que se podría utilizar para lastimarse (ejemplos: píldoras, cuerdas, cordones de extensión, arma de sumit)  · Obtenga ayuda profesional de la comunidad (jess mental, abuso de sustancias, orientación psicológica)  · No estar solo: llamar a un contacto seguro - malaika persona que confía en que estará allí por usted  · Llamada local LINEA de CRISIS [phone] y [phone]  · Llamada local [employer] [facility] [place] [phone] or www.[employer].com  · Llamada gratuita nacional, líneas de prevención del suicidio  · Preventión del Suicidio Nacional [facility] [phone]  (1961)  · L[place]nea Nacional de la [place] 800-SUICIDE ([phone])       ([employer])

## 2021-07-25 NOTE — DISCHARGE SUMMARY
Discharge Summary:     Date of Admission: 2021  Date of Discharge: 21      Admitting diagnosis:    1. Pregnancy @ 38w3d  2. Active labor  3. Precipitous labor      Discharge Diagnosis:   1. Status post vaginal, spontaneous.      History reviewed. No pertinent past medical history.  OB History    Para Term  AB Living   2 2 2     2   SAB TAB Ectopic Molar Multiple Live Births           0 2      # Outcome Date GA Lbr Amrit/2nd Weight Sex Delivery Anes PTL Lv   2 Term 21 38w3d  2.735 kg (6 lb 0.5 oz) F Vag-Spont None N BISI   1 Term 16 40w0d  2.9 kg (6 lb 6.3 oz) F Vag-Spont   BISI      Birth Comments: Pt states no complications      History reviewed. No pertinent surgical history.  Patient has no known allergies.    Patient Active Problem List   Diagnosis   • Poor fetal growth affecting management of mother in third trimester   • 28 weeks gestation of pregnancy   • High-risk pregnancy supervision, third trimester       Hospital Course:   26 y.o. now , was admitted for precipitous labor, underwent Active Labor, vaginal, spontaneous. Pt gave birth to baby girl with APGARs of 8/9 and weight 2735g.  Patient's postpartum course was unremarkable, with progressive advancement in diet , ambulation and toleration of oral analgesia. Patient without complaints today and desires discharge.  For postpartum contraception, pt does not want the depo shot and desires to f/u outpatient.    Physical Exam:  Temp:  [36.1 °C (97 °F)-37.3 °C (99.1 °F)] 36.8 °C (98.2 °F)  Pulse:  [] 68  Resp:  [16-18] 18  BP: ()/(53-76) 119/76  SpO2:  [97 %-99 %] 97 %  Physical Exam  General: well and resting  Abdomen: no masses, nontender, soft, non-distended  Fundus: firm, below umbilicus and nontender  Incision: not applicable, (vaginal delivery)  Perineum: deferred  Extremities: symmetric and no edema, calves nontender    Current Facility-Administered Medications   Medication Dose   • LR infusion     •  fentaNYL (SUBLIMAZE) injection 50 mcg  50 mcg   • fentaNYL (SUBLIMAZE) injection 100 mcg  100 mcg   • ondansetron (ZOFRAN ODT) dispertab 4 mg  4 mg    Or   • ondansetron (ZOFRAN) syringe/vial injection 4 mg  4 mg   • mag hydrox-al hydrox-simeth (MAALOX PLUS ES or MYLANTA DS) suspension 30 mL  30 mL   • hydrOXYzine HCl (ATARAX) tablet 50 mg  50 mg   • oxytocin (PITOCIN) infusion (for postpartum)   mL/hr   • miSOPROStol (CYTOTEC) tablet 800 mcg  800 mcg   • ibuprofen (MOTRIN) tablet 800 mg  800 mg   • acetaminophen (TYLENOL) tablet 1,000 mg  1,000 mg       Recent Labs     21  1025 21  2306   WBC 12.8* 10.9*   RBC 5.08 4.16*   HEMOGLOBIN 15.3 12.7   HEMATOCRIT 46.0 37.0   MCV 90.6 88.9   MCH 30.1 30.5   MCHC 33.3* 34.3   RDW 43.7 43.0   PLATELETCT 248 217   MPV 12.1 11.3         Activity/ Discharge Instructions::   Discharge to home  Pelvic Rest x 6 weeks  No heavy lifting x4 weeks  Call or come to ED for: heavy vaginal bleeding, fever >100.4, severe abdominal pain, severe headache, chest pain, shortness of breath,  N/V, incisional drainage, or other concerns.       Follow up:  [facility] in 5 weeks for vaginal delivery    Discharge Meds:   Current Outpatient Medications   Medication Sig Dispense Refill   • acetaminophen (TYLENOL) 500 MG Tab Take 2 Tablets by mouth every 6 hours as needed. 30 tablet 0   • ibuprofen (MOTRIN) 800 MG Tab Take 1 tablet by mouth every 8 hours as needed (For cramping after delivery; do not give if patient is receiving ketorolac (Toradol)). 30 tablet 0       Thao Pate is a 25 yo  now postpartum day 1 s/p . Her hospital stay has been uncomplicated and she desires to be discharge.    Plan:  Discharge to home      Jose Cruz Garcia MD, PGY1

## 2021-07-25 NOTE — PROGRESS NOTES
"Discharge instructions reviewed with patient via [id number] \"Piyush\". [place] screen, patient and baby f/u appts also reviewed with patient . Car seat present. Birth certificate done.  patient to  medications from her pharmacy..Bands matched and security tag removed.Mom and baby d/c'd to home.  "

## 2021-12-01 NOTE — PROGRESS NOTES
NST only   Graft Cartilage Fenestration Text: The cartilage was fenestrated with a 2mm punch biopsy to help facilitate graft survival and healing.

## 2022-03-23 ENCOUNTER — OFFICE VISIT (OUTPATIENT)
Dept: URGENT CARE | Facility: PHYSICIAN GROUP | Age: 27
End: 2022-03-23
Payer: COMMERCIAL

## 2022-03-23 ENCOUNTER — APPOINTMENT (OUTPATIENT)
Dept: RADIOLOGY | Facility: IMAGING CENTER | Age: 27
End: 2022-03-23
Attending: PHYSICIAN ASSISTANT
Payer: COMMERCIAL

## 2022-03-23 VITALS
SYSTOLIC BLOOD PRESSURE: 124 MMHG | BODY MASS INDEX: 26.4 KG/M2 | DIASTOLIC BLOOD PRESSURE: 80 MMHG | RESPIRATION RATE: 12 BRPM | HEIGHT: 67 IN | HEART RATE: 100 BPM | TEMPERATURE: 98.2 F | WEIGHT: 168.2 LBS | OXYGEN SATURATION: 98 %

## 2022-03-23 DIAGNOSIS — S99.911A INJURY OF RIGHT ANKLE AND FOOT, INITIAL ENCOUNTER: ICD-10-CM

## 2022-03-23 DIAGNOSIS — S99.921A INJURY OF RIGHT ANKLE AND FOOT, INITIAL ENCOUNTER: ICD-10-CM

## 2022-03-23 DIAGNOSIS — S92.031A CLOSED DISPLACED AVULSION FRACTURE OF TUBEROSITY OF RIGHT CALCANEUS, INITIAL ENCOUNTER: ICD-10-CM

## 2022-03-23 PROCEDURE — 99214 OFFICE O/P EST MOD 30 MIN: CPT | Performed by: PHYSICIAN ASSISTANT

## 2022-03-23 PROCEDURE — 73610 X-RAY EXAM OF ANKLE: CPT | Mod: TC,RT | Performed by: RADIOLOGY

## 2022-03-23 PROCEDURE — 73630 X-RAY EXAM OF FOOT: CPT | Mod: TC,RT | Performed by: RADIOLOGY

## 2022-03-23 ASSESSMENT — ENCOUNTER SYMPTOMS
NAUSEA: 0
COUGH: 0
FEVER: 0
EYE DISCHARGE: 0
VOMITING: 0
EYE REDNESS: 0
JOINT SWELLING: 1

## 2022-03-23 NOTE — LETTER
March 23, 2022         Patient: Thao Pate   YOB: 1995   Date of Visit: 3/23/2022           To Whom it May Concern:    Thao Pate was seen in my clinic on 3/23/2022. Please excuse her from work 3/24 and 3/25. She may return to work on 3/28.  Please allow the patient to utilize crutches while at work until seen by the specialist.    If you have any questions or concerns, please don't hesitate to call.        Sincerely,           Marisol Cutler P.A.-C.  Electronically Signed

## 2022-03-24 NOTE — PROGRESS NOTES
Subjective     Thao Pate is a 27 y.o. female who presents with Fall (Fell yesterday , injury on left foot , bruising , painful to walk on it )        This is a new problem.  The patient presents to clinic complaining of an injury to her right ankle/foot x1 day ago.  The patient states she fell off the bed twisting her right ankle and foot.  The patient states she heard a crack/pop to the lateral aspect of her right ankle.  The patient developed subsequent pain, swelling, and bruising.  The patient reports increased pain with walking.  The patient also reports decreased range of motion of the right ankle/foot secondary to pain and swelling.  She reports no numbness, tingling or weakness.  The patient has taken OTC pain reliever for her current symptoms.    Foot Problem  This is a new problem. Episode onset: x 1 day ago. The problem occurs constantly. Associated symptoms include joint swelling. Pertinent negatives include no congestion, coughing, fever, nausea or vomiting. Treatments tried: OTC pain reliever.     PMH:  has no past medical history of Addisons disease ([facility]), Adrenal disorder ([facility]), Allergy, Anemia, Anxiety, Arrhythmia, Arthritis, Asthma, Blood transfusion without reported diagnosis, Cancer ([facility]), Cataract, CHF (congestive heart failure) ([facility]), Clotting disorder ([facility]), COPD (chronic obstructive pulmonary disease) ([facility]), Cushings syndrome ([facility]), Depression, Diabetes ([facility]), Diabetic neuropathy ([facility]), GERD (gastroesophageal reflux disease), Glaucoma, Goiter, Head ache, Heart attack ([facility]), Heart murmur, HIV (human immunodeficiency virus infection) ([facility]), Hyperlipidemia, Hypertension, IBD (inflammatory bowel disease), Kidney disease, Meningitis, Migraine, Muscle disorder, Osteoporosis, Parathyroid disorder ([facility]), Pituitary disease ([facility]), Pulmonary emphysema ([facility]), Seizure ([facility]), Sickle cell disease ([facility]), Stroke ([facility]), Substance abuse ([facility]), Thyroid disease, Tuberculosis, or Urinary tract  "infection.  MEDS:   Current Outpatient Medications:   •  acetaminophen (TYLENOL) 500 MG Tab, Take 2 Tablets by mouth every 6 hours as needed., Disp: 30 tablet, Rfl: 0  •  ibuprofen (MOTRIN) 800 MG Tab, Take 1 tablet by mouth every 8 hours as needed (For cramping after delivery; do not give if patient is receiving ketorolac (Toradol))., Disp: 30 tablet, Rfl: 0  •  Prenatal MV-Min-Fe Fum-FA-DHA (PRENATAL 1 PO), Take  by mouth. (Patient not taking: Reported on 3/23/2022), Disp: , Rfl:   ALLERGIES: No Known Allergies  SURGHX: No past surgical history on file.  SOCHX:  reports that she has never smoked. She has never used smokeless tobacco. She reports that she does not drink alcohol and does not use drugs.  FH: Family history was reviewed, no pertinent findings to report      Review of Systems   Constitutional: Negative for fever.   HENT: Negative for congestion.    Eyes: Negative for discharge and redness.   Respiratory: Negative for cough.    Gastrointestinal: Negative for nausea and vomiting.   Musculoskeletal: Positive for joint pain and joint swelling.              Objective     /80 (BP Location: Left arm, Patient Position: Sitting, BP Cuff Size: Adult)   Pulse 100   Temp 36.8 °C (98.2 °F) (Temporal)   Resp 12   Ht 1.7 m (5' 6.93\")   Wt 76.3 kg (168 lb 3.2 oz)   SpO2 98%   BMI 26.40 kg/m²      Physical Exam  Constitutional:       General: She is not in acute distress.     Appearance: Normal appearance. She is well-developed. She is not ill-appearing.   HENT:      Head: Normocephalic and atraumatic.      Right Ear: External ear normal.      Left Ear: External ear normal.   Eyes:      Extraocular Movements: Extraocular movements intact.      Conjunctiva/sclera: Conjunctivae normal.   Cardiovascular:      Rate and Rhythm: Normal rate.   Pulmonary:      Effort: Pulmonary effort is normal.   Musculoskeletal:      Cervical back: Normal range of motion and neck supple.      Comments:   Right " Ankle/Foot:  Tenderness to the lateral aspect of the right ankle overlying the lateral malleolus with tenderness extending to the proximal lateral aspect of the right foot overlying the fifth metatarsal.  Localized swelling to the lateral aspect of the right ankle/foot with associated ecchymosis.  No overlying erythema.  No increased warmth.  No open wound/lesions.  Decreased ROM -the patient demonstrates limited range of motion of the right ankle/foot secondary to pain  Neurovascular intact distally  Strength -not assessed secondary to pain  Antalgic gait   Skin:     General: Skin is warm and dry.   Neurological:      Mental Status: She is alert and oriented to person, place, and time.            Progress:  Right Ankle XR:  COMPARISON: None     FINDINGS:     There is no focal soft tissue swelling.     There is no displaced fracture or dislocation.     The alignment of the ankle is within normal limits.     IMPRESSION:  1.  Unremarkable right ankle series.    Right Foot XR:   FINDINGS:     There is no focal soft tissue swelling.     There is a curvilinear radiodensity projecting just lateral to the distal calcaneus which conceivably could be an avulsion fracture. No other fractures are seen. The metatarsals and phalanges are intact.     The alignment is maintained.     IMPRESSION:  1.  There is a questionable thin avulsion fracture from the anterior lateral calcaneus.      Provided the patient with a tall CAM walking boot support/stabilization of her acute fracture.    Provided the patient with crutches for nonweightbearing.    Instructed patient to use the walking boot and crutches until she follows up with the specialist.    Will place a referral to sports medicine for further evaluation and management.           Assessment & Plan          1. Injury of right ankle and foot, initial encounter  - DX-FOOT-COMPLETE 3+ RIGHT; Future  - DX-ANKLE 3+ VIEWS RIGHT; Future    2. Closed displaced avulsion fracture of  tuberosity of right calcaneus, initial encounter  - Referral to Sports Medicine    Differential diagnoses, supportive care, and indications for immediate follow-up discussed with patient.   Instructed to return to clinic or nearest emergency department for any change in condition, further concerns, or worsening of symptoms.    OTC NSAIDs for pain/discomfort   RICE  Wear CAM walking boot for additional support/stabilziation   Non-weight-bearing with the use of crutches  Referral to Sports Medicine   Follow-up with PCP   Work note provided   Return to clinic or go tot he ED if symptoms worsen or fail to improve, or if the patient should develop worsening/increasing pain/tenderness, swelling, bruising, redness or warmth to the affected area, decreased ROM, numbness, tingling or weakness, difficulty walking, fever/chills, and/or any concerning symptoms.     Discussed plan with the patient, and she agrees to the above.     I personally reviewed prior external notes and test results pertinent to today's visit.  I have independently reviewed and interpreted all diagnostics ordered during this urgent care visit.     Time spent evaluating this patient was at least 30 minutes and includes preparing for visit, obtaining history, exam and evaluation, ordering labs/tests/procedures/medications, independent interpretation, and counseling/education.    Please note that this dictation was created using voice recognition software. I have made every reasonable attempt to correct obvious errors, but I expect that there may be errors of grammar and possibly content that I did not discover before finalizing the note.     This note was electronically signed by Marisol Cutler PA-C

## 2022-12-14 NOTE — DISCHARGE PLANNING
Birth certificate has been completed.   Antisocial behavior/cognition (past or present)/Affective dysregulation/Impulsivity/History of being victimized/traumatized/Noncompliance with treatment

## 2023-07-10 ENCOUNTER — OFFICE VISIT (OUTPATIENT)
Dept: URGENT CARE | Facility: CLINIC | Age: 28
End: 2023-07-10
Payer: COMMERCIAL

## 2023-07-10 VITALS
OXYGEN SATURATION: 99 % | RESPIRATION RATE: 12 BRPM | WEIGHT: 165 LBS | SYSTOLIC BLOOD PRESSURE: 122 MMHG | BODY MASS INDEX: 25.9 KG/M2 | DIASTOLIC BLOOD PRESSURE: 74 MMHG | HEART RATE: 86 BPM | TEMPERATURE: 97.8 F

## 2023-07-10 DIAGNOSIS — J02.0 STREP PHARYNGITIS: ICD-10-CM

## 2023-07-10 DIAGNOSIS — J02.9 SORE THROAT: ICD-10-CM

## 2023-07-10 LAB — S PYO DNA SPEC NAA+PROBE: NOT DETECTED

## 2023-07-10 PROCEDURE — 99214 OFFICE O/P EST MOD 30 MIN: CPT | Performed by: PHYSICIAN ASSISTANT

## 2023-07-10 PROCEDURE — 3074F SYST BP LT 130 MM HG: CPT | Performed by: PHYSICIAN ASSISTANT

## 2023-07-10 PROCEDURE — 3078F DIAST BP <80 MM HG: CPT | Performed by: PHYSICIAN ASSISTANT

## 2023-07-10 PROCEDURE — 87651 STREP A DNA AMP PROBE: CPT | Performed by: PHYSICIAN ASSISTANT

## 2023-07-10 RX ORDER — AMOXICILLIN 500 MG/1
500 CAPSULE ORAL 2 TIMES DAILY
Qty: 20 CAPSULE | Refills: 0 | Status: SHIPPED | OUTPATIENT
Start: 2023-07-10

## 2023-07-10 ASSESSMENT — ENCOUNTER SYMPTOMS
ABDOMINAL PAIN: 0
VOMITING: 0
DIARRHEA: 0
TROUBLE SWALLOWING: 1
SWOLLEN GLANDS: 1
COUGH: 0
SHORTNESS OF BREATH: 0

## 2023-07-10 NOTE — PROGRESS NOTES
Subjective     Thao aPte is a very pleasant 28 y.o. female who presents with Pharyngitis (Sore throat x Friday)            Pharyngitis   This is a new problem. The current episode started in the past 7 days. The problem has been unchanged. The pain is worse on the left side. There has been no fever. The fever has been present for Less than 1 day. Associated symptoms include ear pain, swollen glands and trouble swallowing. Pertinent negatives include no abdominal pain, congestion, coughing, diarrhea, shortness of breath or vomiting. She has had no exposure to strep. She has tried acetaminophen for the symptoms. The treatment provided mild relief.       PMH:  has no past medical history of Addisons disease ([facility]), Adrenal disorder ([facility]), Allergy, Anemia, Anxiety, Arrhythmia, Arthritis, Asthma, Blood transfusion without reported diagnosis, Cancer ([facility]), Cataract, CHF (congestive heart failure) ([facility]), Clotting disorder ([facility]), COPD (chronic obstructive pulmonary disease) ([facility]), Cushings syndrome ([facility]), Depression, Diabetes ([facility]), Diabetic neuropathy ([facility]), GERD (gastroesophageal reflux disease), Glaucoma, Goiter, Head ache, Heart attack ([facility]), Heart murmur, HIV (human immunodeficiency virus infection) ([facility]), Hyperlipidemia, Hypertension, IBD (inflammatory bowel disease), Kidney disease, Meningitis, Migraine, Muscle disorder, Osteoporosis, Parathyroid disorder ([facility]), Pituitary disease ([facility]), Pulmonary emphysema ([facility]), Seizure ([facility]), Sickle cell disease ([facility]), Stroke ([facility]), Substance abuse ([facility]), Thyroid disease, Tuberculosis, or Urinary tract infection.  MEDS:   Current Outpatient Medications:     amoxicillin (AMOXIL) 500 MG Cap, Take 1 Capsule by mouth 2 times a day., Disp: 20 Capsule, Rfl: 0    acetaminophen (TYLENOL) 500 MG Tab, Take 2 Tablets by mouth every 6 hours as needed., Disp: 30 tablet, Rfl: 0    ibuprofen (MOTRIN) 800 MG Tab, Take 1 tablet by mouth every 8 hours as needed (For cramping after  delivery; do not give if patient is receiving ketorolac (Toradol))., Disp: 30 tablet, Rfl: 0  ALLERGIES: No Known Allergies  SURGHX: History reviewed. No pertinent surgical history.  SOCHX:  reports that she has never smoked. She has never used smokeless tobacco. She reports that she does not drink alcohol and does not use drugs.  FH: family history includes Alzheimer's Disease in her paternal grandfather; Cancer in her maternal grandfather and maternal grandmother; No Known Problems in her brother, brother, father, mother, paternal grandmother, sister, sister, and sister.      Review of Systems   HENT:  Positive for ear pain and trouble swallowing. Negative for congestion.    Respiratory:  Negative for cough and shortness of breath.    Gastrointestinal:  Negative for abdominal pain, diarrhea and vomiting.       Medications, Allergies, and current problem list reviewed today in Epic           Objective     /74   Pulse 86   Temp 36.6 °C (97.8 °F) (Temporal)   Resp 12   Wt 74.8 kg (165 lb)   SpO2 99%   BMI 25.90 kg/m²      Physical Exam  Vitals and nursing note reviewed.   Constitutional:       General: She is not in acute distress.     Appearance: Normal appearance. She is well-developed. She is not ill-appearing, toxic-appearing or diaphoretic.   HENT:      Head: Normocephalic and atraumatic.      Right Ear: Hearing, tympanic membrane, ear canal and external ear normal.      Left Ear: Hearing, tympanic membrane, ear canal and external ear normal.      Nose: No congestion or rhinorrhea.      Mouth/Throat:      Mouth: Mucous membranes are moist.      Dentition: Normal dentition. No dental caries.      Pharynx: Uvula midline. Pharyngeal swelling, oropharyngeal exudate and posterior oropharyngeal erythema present. No uvula swelling.      Tonsils: Tonsillar exudate present. No tonsillar abscesses.      Comments: Scattered left-sided palate petechiae  Eyes:      General: No scleral icterus.        Right eye:  No discharge.         Left eye: No discharge.      Conjunctiva/sclera: Conjunctivae normal.   Neck:      Thyroid: No thyromegaly.      Trachea: No tracheal deviation.   Cardiovascular:      Rate and Rhythm: Normal rate and regular rhythm.      Pulses: Normal pulses.      Heart sounds: Normal heart sounds.   Pulmonary:      Effort: Pulmonary effort is normal. No respiratory distress.      Breath sounds: Normal breath sounds. No wheezing, rhonchi or rales.   Musculoskeletal:      Cervical back: Normal range of motion and neck supple.   Lymphadenopathy:      Head:      Right side of head: No submandibular adenopathy.      Left side of head: Submandibular, tonsillar, preauricular, posterior auricular and occipital adenopathy present.      Cervical: Cervical adenopathy present.      Right cervical: No superficial cervical adenopathy.     Left cervical: No superficial cervical adenopathy.   Skin:     General: Skin is warm and dry.      Nails: There is no clubbing.   Neurological:      General: No focal deficit present.      Mental Status: She is alert and oriented to person, place, and time. Mental status is at baseline.   Psychiatric:         Mood and Affect: Mood normal.         Behavior: Behavior normal.         Thought Content: Thought content normal.         Judgment: Judgment normal.                             Assessment & Plan     This is a very pleasant 28-year-old female presenting with left-sided sore throat and swollen lymph nodes for the last 2 days.  Some chills and body aches but no true fever.  No cough or congestion.  No vomiting or diarrhea.  No sick contacts at home.  Vital signs are normal.  Exam shows posterior oropharynx erythema and edema.  Left-sided tonsillar enlargement with exudate noted palate petechiae appreciated.  Significant left-sided cervical adenopathy noted.  Remainder of exam completely benign.  In clinic strep testing negative however based on severity of symptoms and presentation I  will treat for exudative tonsillitis.    1. Sore throat  POCT CEPHEID GROUP A STREP - PCR      2. Strep pharyngitis  amoxicillin (AMOXIL) 500 MG Cap          I personally reviewed prior external notes and test results pertinent to today's visit. Return to clinic or go to ED if symptoms worsen or persist. Red flag symptoms and indications for ED discussed at length. Patient/Parent/Guardian voices understanding. Follow-up with your primary care provider in 3-5 days. All side effects and potential interactions of prescribed medication discussed including allergic response, GI upset, tendon injury, rash, sedation, OCP effectiveness, etc.    Please note that this dictation was created using voice recognition software. I have made every reasonable attempt to correct obvious errors, but I expect that there are errors of grammar and possibly content that I did not discover before finalizing the note.

## 2023-07-10 NOTE — LETTER
July 10, 2023         Patient: Thao Pate   YOB: 1995   Date of Visit: 7/10/2023           To Whom it May Concern:    Thao Pate was seen in my clinic on 7/10/2023. Please excuse any absences from work this week due to acute illness.        If you have any questions or concerns, please don't hesitate to call.        Sincerely,           Jose Cruz Gannon P.A.-C.  Electronically Signed

## 2025-08-25 ENCOUNTER — OFFICE VISIT (OUTPATIENT)
Dept: URGENT CARE | Facility: CLINIC | Age: 30
End: 2025-08-25
Payer: MEDICAID

## 2025-08-25 VITALS
OXYGEN SATURATION: 97 % | RESPIRATION RATE: 13 BRPM | HEIGHT: 67 IN | SYSTOLIC BLOOD PRESSURE: 120 MMHG | HEART RATE: 78 BPM | WEIGHT: 164 LBS | DIASTOLIC BLOOD PRESSURE: 74 MMHG | BODY MASS INDEX: 25.74 KG/M2 | TEMPERATURE: 97.6 F

## 2025-08-25 DIAGNOSIS — S39.012A STRAIN OF LUMBAR REGION, INITIAL ENCOUNTER: ICD-10-CM
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
## 2025-08-25 RX ORDER — IBUPROFEN 600 MG/1
600 TABLET, FILM COATED ORAL EVERY 6 HOURS PRN
Qty: 30 TABLET | Refills: 0 | Status: SHIPPED | OUTPATIENT
Start: 2025-08-25 | End: 2025-08-26

## 2025-08-25 ASSESSMENT — LIFESTYLE VARIABLES: HOW MANY STANDARD DRINKS CONTAINING ALCOHOL DO YOU HAVE ON A TYPICAL DAY: PATIENT DOES NOT DRINK

## 2025-08-26 RX ORDER — DICLOFENAC SODIUM 75 MG/1
75 TABLET, DELAYED RELEASE ORAL 2 TIMES DAILY
Qty: 20 TABLET | Refills: 0 | Status: SHIPPED | OUTPATIENT
Start: 2025-08-26